# Patient Record
Sex: FEMALE | Race: WHITE | NOT HISPANIC OR LATINO | URBAN - METROPOLITAN AREA
[De-identification: names, ages, dates, MRNs, and addresses within clinical notes are randomized per-mention and may not be internally consistent; named-entity substitution may affect disease eponyms.]

---

## 2017-02-02 ENCOUNTER — INPATIENT (INPATIENT)
Facility: HOSPITAL | Age: 82
LOS: 2 days | Discharge: HOME | End: 2017-02-05
Attending: INTERNAL MEDICINE | Admitting: INTERNAL MEDICINE

## 2017-05-10 ENCOUNTER — OUTPATIENT (OUTPATIENT)
Dept: OUTPATIENT SERVICES | Facility: HOSPITAL | Age: 82
LOS: 1 days | Discharge: HOME | End: 2017-05-10

## 2017-06-28 DIAGNOSIS — I10 ESSENTIAL (PRIMARY) HYPERTENSION: ICD-10-CM

## 2017-06-28 DIAGNOSIS — H25.11 AGE-RELATED NUCLEAR CATARACT, RIGHT EYE: ICD-10-CM

## 2017-06-28 DIAGNOSIS — I48.91 UNSPECIFIED ATRIAL FIBRILLATION: ICD-10-CM

## 2017-06-28 DIAGNOSIS — Z86.73 PERSONAL HISTORY OF TRANSIENT ISCHEMIC ATTACK (TIA), AND CEREBRAL INFARCTION WITHOUT RESIDUAL DEFICITS: ICD-10-CM

## 2017-06-28 DIAGNOSIS — I50.9 HEART FAILURE, UNSPECIFIED: ICD-10-CM

## 2017-07-13 DIAGNOSIS — I10 ESSENTIAL (PRIMARY) HYPERTENSION: ICD-10-CM

## 2017-07-13 DIAGNOSIS — K22.2 ESOPHAGEAL OBSTRUCTION: ICD-10-CM

## 2017-07-13 DIAGNOSIS — K21.9 GASTRO-ESOPHAGEAL REFLUX DISEASE WITHOUT ESOPHAGITIS: ICD-10-CM

## 2017-07-13 DIAGNOSIS — R04.0 EPISTAXIS: ICD-10-CM

## 2017-07-13 DIAGNOSIS — Z79.01 LONG TERM (CURRENT) USE OF ANTICOAGULANTS: ICD-10-CM

## 2017-07-13 DIAGNOSIS — Z86.73 PERSONAL HISTORY OF TRANSIENT ISCHEMIC ATTACK (TIA), AND CEREBRAL INFARCTION WITHOUT RESIDUAL DEFICITS: ICD-10-CM

## 2017-07-13 DIAGNOSIS — I48.91 UNSPECIFIED ATRIAL FIBRILLATION: ICD-10-CM

## 2018-10-17 ENCOUNTER — INPATIENT (INPATIENT)
Facility: HOSPITAL | Age: 83
LOS: 1 days | Discharge: HOME | End: 2018-10-19
Attending: INTERNAL MEDICINE | Admitting: INTERNAL MEDICINE

## 2018-10-17 VITALS
TEMPERATURE: 96 F | SYSTOLIC BLOOD PRESSURE: 152 MMHG | DIASTOLIC BLOOD PRESSURE: 71 MMHG | OXYGEN SATURATION: 100 % | HEART RATE: 81 BPM | RESPIRATION RATE: 18 BRPM

## 2018-10-17 DIAGNOSIS — Z96.7 PRESENCE OF OTHER BONE AND TENDON IMPLANTS: Chronic | ICD-10-CM

## 2018-10-17 DIAGNOSIS — Z90.49 ACQUIRED ABSENCE OF OTHER SPECIFIED PARTS OF DIGESTIVE TRACT: Chronic | ICD-10-CM

## 2018-10-17 LAB
ALBUMIN SERPL ELPH-MCNC: 4.1 G/DL — SIGNIFICANT CHANGE UP (ref 3.5–5.2)
ALP SERPL-CCNC: 98 U/L — SIGNIFICANT CHANGE UP (ref 30–115)
ALT FLD-CCNC: 12 U/L — SIGNIFICANT CHANGE UP (ref 0–41)
ANION GAP SERPL CALC-SCNC: 16 MMOL/L — HIGH (ref 7–14)
APPEARANCE UR: ABNORMAL
AST SERPL-CCNC: 29 U/L — SIGNIFICANT CHANGE UP (ref 0–41)
BACTERIA # UR AUTO: ABNORMAL /HPF
BASE EXCESS BLDV CALC-SCNC: 0.8 MMOL/L — SIGNIFICANT CHANGE UP (ref -2–2)
BASOPHILS # BLD AUTO: 0.11 K/UL — SIGNIFICANT CHANGE UP (ref 0–0.2)
BASOPHILS NFR BLD AUTO: 1.2 % — HIGH (ref 0–1)
BILIRUB DIRECT SERPL-MCNC: <0.2 MG/DL — SIGNIFICANT CHANGE UP (ref 0–0.2)
BILIRUB INDIRECT FLD-MCNC: >0.3 MG/DL — SIGNIFICANT CHANGE UP (ref 0.2–1.2)
BILIRUB SERPL-MCNC: 0.5 MG/DL — SIGNIFICANT CHANGE UP (ref 0.2–1.2)
BILIRUB UR-MCNC: NEGATIVE — SIGNIFICANT CHANGE UP
BUN SERPL-MCNC: 13 MG/DL — SIGNIFICANT CHANGE UP (ref 10–20)
CA-I SERPL-SCNC: 1.14 MMOL/L — SIGNIFICANT CHANGE UP (ref 1.12–1.3)
CALCIUM SERPL-MCNC: 9.1 MG/DL — SIGNIFICANT CHANGE UP (ref 8.5–10.1)
CHLORIDE SERPL-SCNC: 101 MMOL/L — SIGNIFICANT CHANGE UP (ref 98–110)
CO2 SERPL-SCNC: 24 MMOL/L — SIGNIFICANT CHANGE UP (ref 17–32)
COLOR SPEC: YELLOW — SIGNIFICANT CHANGE UP
CREAT SERPL-MCNC: 0.7 MG/DL — SIGNIFICANT CHANGE UP (ref 0.7–1.5)
DIFF PNL FLD: ABNORMAL
EOSINOPHIL # BLD AUTO: 0.35 K/UL — SIGNIFICANT CHANGE UP (ref 0–0.7)
EOSINOPHIL NFR BLD AUTO: 3.9 % — SIGNIFICANT CHANGE UP (ref 0–8)
GAS PNL BLDV: 137 MMOL/L — SIGNIFICANT CHANGE UP (ref 136–145)
GAS PNL BLDV: SIGNIFICANT CHANGE UP
GAS PNL BLDV: SIGNIFICANT CHANGE UP
GLUCOSE SERPL-MCNC: 98 MG/DL — SIGNIFICANT CHANGE UP (ref 70–99)
GLUCOSE UR QL: NEGATIVE MG/DL — SIGNIFICANT CHANGE UP
HCO3 BLDV-SCNC: 26 MMOL/L — SIGNIFICANT CHANGE UP (ref 22–29)
HCT VFR BLD CALC: 36.2 % — LOW (ref 37–47)
HCT VFR BLDA CALC: 31.1 % — LOW (ref 34–44)
HGB BLD CALC-MCNC: 10.1 G/DL — LOW (ref 14–18)
HGB BLD-MCNC: 9.8 G/DL — LOW (ref 12–16)
IMM GRANULOCYTES NFR BLD AUTO: 0.3 % — SIGNIFICANT CHANGE UP (ref 0.1–0.3)
KETONES UR-MCNC: ABNORMAL
LACTATE BLDV-MCNC: 1.1 MMOL/L — SIGNIFICANT CHANGE UP (ref 0.5–1.6)
LEUKOCYTE ESTERASE UR-ACNC: ABNORMAL
LIDOCAIN IGE QN: 37 U/L — SIGNIFICANT CHANGE UP (ref 7–60)
LYMPHOCYTES # BLD AUTO: 1.25 K/UL — SIGNIFICANT CHANGE UP (ref 1.2–3.4)
LYMPHOCYTES # BLD AUTO: 13.8 % — LOW (ref 20.5–51.1)
MAGNESIUM SERPL-MCNC: 2.3 MG/DL — SIGNIFICANT CHANGE UP (ref 1.8–2.4)
MCHC RBC-ENTMCNC: 19.1 PG — LOW (ref 27–31)
MCHC RBC-ENTMCNC: 27.1 G/DL — LOW (ref 32–37)
MCV RBC AUTO: 70.7 FL — LOW (ref 81–99)
MONOCYTES # BLD AUTO: 0.89 K/UL — HIGH (ref 0.1–0.6)
MONOCYTES NFR BLD AUTO: 9.8 % — HIGH (ref 1.7–9.3)
NEUTROPHILS # BLD AUTO: 6.41 K/UL — SIGNIFICANT CHANGE UP (ref 1.4–6.5)
NEUTROPHILS NFR BLD AUTO: 71 % — SIGNIFICANT CHANGE UP (ref 42.2–75.2)
NITRITE UR-MCNC: POSITIVE
NRBC # BLD: 0 /100 WBCS — SIGNIFICANT CHANGE UP (ref 0–0)
PCO2 BLDV: 44 MMHG — SIGNIFICANT CHANGE UP (ref 41–51)
PH BLDV: 7.38 — SIGNIFICANT CHANGE UP (ref 7.26–7.43)
PH UR: 6.5 — SIGNIFICANT CHANGE UP (ref 5–8)
PLATELET # BLD AUTO: 291 K/UL — SIGNIFICANT CHANGE UP (ref 130–400)
PO2 BLDV: 28 MMHG — SIGNIFICANT CHANGE UP (ref 20–40)
POTASSIUM BLDV-SCNC: 3.5 MMOL/L — SIGNIFICANT CHANGE UP (ref 3.3–5.6)
POTASSIUM SERPL-MCNC: 4.5 MMOL/L — SIGNIFICANT CHANGE UP (ref 3.5–5)
POTASSIUM SERPL-SCNC: 4.5 MMOL/L — SIGNIFICANT CHANGE UP (ref 3.5–5)
PROT SERPL-MCNC: 7.3 G/DL — SIGNIFICANT CHANGE UP (ref 6–8)
PROT UR-MCNC: 30 MG/DL
RBC # BLD: 5.12 M/UL — SIGNIFICANT CHANGE UP (ref 4.2–5.4)
RBC # FLD: 19.5 % — HIGH (ref 11.5–14.5)
SAO2 % BLDV: 43 % — SIGNIFICANT CHANGE UP
SODIUM SERPL-SCNC: 141 MMOL/L — SIGNIFICANT CHANGE UP (ref 135–146)
SP GR SPEC: 1.02 — SIGNIFICANT CHANGE UP (ref 1.01–1.03)
TROPONIN T SERPL-MCNC: <0.01 NG/ML — SIGNIFICANT CHANGE UP
UROBILINOGEN FLD QL: 1 MG/DL (ref 0.2–0.2)
WBC # BLD: 9.04 K/UL — SIGNIFICANT CHANGE UP (ref 4.8–10.8)
WBC # FLD AUTO: 9.04 K/UL — SIGNIFICANT CHANGE UP (ref 4.8–10.8)
WBC UR QL: >50 /HPF

## 2018-10-17 RX ORDER — CEFTRIAXONE 500 MG/1
1 INJECTION, POWDER, FOR SOLUTION INTRAMUSCULAR; INTRAVENOUS ONCE
Qty: 0 | Refills: 0 | Status: COMPLETED | OUTPATIENT
Start: 2018-10-17 | End: 2018-10-17

## 2018-10-17 RX ORDER — SENNA PLUS 8.6 MG/1
2 TABLET ORAL AT BEDTIME
Qty: 0 | Refills: 0 | Status: DISCONTINUED | OUTPATIENT
Start: 2018-10-17 | End: 2018-10-19

## 2018-10-17 RX ORDER — MAGNESIUM SULFATE 500 MG/ML
2 VIAL (ML) INJECTION ONCE
Qty: 0 | Refills: 0 | Status: COMPLETED | OUTPATIENT
Start: 2018-10-17 | End: 2018-10-17

## 2018-10-17 RX ORDER — PANTOPRAZOLE SODIUM 20 MG/1
40 TABLET, DELAYED RELEASE ORAL
Qty: 0 | Refills: 0 | Status: DISCONTINUED | OUTPATIENT
Start: 2018-10-17 | End: 2018-10-19

## 2018-10-17 RX ORDER — ENOXAPARIN SODIUM 100 MG/ML
40 INJECTION SUBCUTANEOUS DAILY
Qty: 0 | Refills: 0 | Status: DISCONTINUED | OUTPATIENT
Start: 2018-10-17 | End: 2018-10-19

## 2018-10-17 RX ORDER — ASPIRIN/CALCIUM CARB/MAGNESIUM 324 MG
81 TABLET ORAL DAILY
Qty: 0 | Refills: 0 | Status: DISCONTINUED | OUTPATIENT
Start: 2018-10-17 | End: 2018-10-19

## 2018-10-17 RX ORDER — DOCUSATE SODIUM 100 MG
100 CAPSULE ORAL THREE TIMES A DAY
Qty: 0 | Refills: 0 | Status: DISCONTINUED | OUTPATIENT
Start: 2018-10-17 | End: 2018-10-19

## 2018-10-17 RX ORDER — CEFTRIAXONE 500 MG/1
1 INJECTION, POWDER, FOR SOLUTION INTRAMUSCULAR; INTRAVENOUS EVERY 24 HOURS
Qty: 0 | Refills: 0 | Status: DISCONTINUED | OUTPATIENT
Start: 2018-10-17 | End: 2018-10-19

## 2018-10-17 RX ORDER — METOPROLOL TARTRATE 50 MG
25 TABLET ORAL
Qty: 0 | Refills: 0 | Status: DISCONTINUED | OUTPATIENT
Start: 2018-10-17 | End: 2018-10-19

## 2018-10-17 RX ORDER — RIVAROXABAN 15 MG-20MG
0 KIT ORAL
Qty: 0 | Refills: 0 | COMMUNITY

## 2018-10-17 RX ORDER — SODIUM CHLORIDE 9 MG/ML
125 INJECTION INTRAMUSCULAR; INTRAVENOUS; SUBCUTANEOUS ONCE
Qty: 0 | Refills: 0 | Status: COMPLETED | OUTPATIENT
Start: 2018-10-17 | End: 2018-10-17

## 2018-10-17 RX ADMIN — Medication 100 GRAM(S): at 17:07

## 2018-10-17 RX ADMIN — SODIUM CHLORIDE 125 MILLILITER(S): 9 INJECTION INTRAMUSCULAR; INTRAVENOUS; SUBCUTANEOUS at 17:07

## 2018-10-17 RX ADMIN — CEFTRIAXONE 100 GRAM(S): 500 INJECTION, POWDER, FOR SOLUTION INTRAMUSCULAR; INTRAVENOUS at 20:55

## 2018-10-17 NOTE — ED PROVIDER NOTE - PHYSICAL EXAMINATION
Physical Exam    Vital Signs: I have reviewed the initial vital signs.  Constitutional: well-nourished, appears stated age, appears uncomfortably moving around in stretcher  HEENT: Conjunctiva pink, Sclera clear, PERRLA, EOMI. Mucous membranes moist, no exudates or lesions noted. Patient hard of hearing  Cardiovascular: S1 and S2 present, regular rate, regular rhythm, well-perfused extremities, radial pulses equal and 2+ No peripheral edema  Respiratory: unlabored respiratory effort, clear to auscultation bilaterally no wheezing, rales and rhonchi  Gastrointestinal: soft, non-tender abdomen, no pulsatile mass, active bowel sounds in all 4 quadrants. No reproducible pain with palpation. No distention or rigidity  Musculoskeletal: supple nontender neck, no midline tenderness, no joint pain  Integumentary: warm, dry, no rash  Neurologic: awake, alert to person. CN 2-12 intact, motor functions grossly intact  Psychiatric: appropriate mood, appropriate affect

## 2018-10-17 NOTE — H&P ADULT - NSHPLABSRESULTS_GEN_ALL_CORE
Allergies    No Known Allergies    Intolerances        T(F): 96 (10-17-18 @ 21:30), Max: 98.4 (10-17-18 @ 17:10)  HR: 76 (10-17-18 @ 21:30) (76 - 81)  BP: 147/79 (10-17-18 @ 21:30) (147/79 - 158/77)  BP(mean): --  RR: 18 (10-17-18 @ 21:30) (18 - 18)  SpO2: 98% (10-17-18 @ 20:52) (98% - 100%)    10-17    141  |  101  |  13  ----------------------------<  98  4.5   |  24  |  0.7    Ca    9.1      17 Oct 2018 16:21  Mg     2.3     10-17    TPro  7.3  /  Alb  4.1  /  TBili  0.5  /  DBili  <0.2  /  AST  29  /  ALT  12  /  AlkPhos  98  10-17                            9.8    9.04  )-----------( 291      ( 17 Oct 2018 15:52 )             36.2                   Urinalysis Basic - ( 17 Oct 2018 19:50 )    Color: Yellow / Appearance: Turbid / S.020 / pH: x  Gluc: x / Ketone: Trace  / Bili: Negative / Urobili: 1.0 mg/dL   Blood: x / Protein: 30 mg/dL / Nitrite: Positive   Leuk Esterase: Large / RBC: x / WBC >50 /HPF   Sq Epi: x / Non Sq Epi: x / Bacteria: Moderate /HPF          Urinalysis Basic - ( 17 Oct 2018 19:50 )    Color: Yellow / Appearance: Turbid / S.020 / pH: x  Gluc: x / Ketone: Trace  / Bili: Negative / Urobili: 1.0 mg/dL   Blood: x / Protein: 30 mg/dL / Nitrite: Positive   Leuk Esterase: Large / RBC: x / WBC >50 /HPF   Sq Epi: x / Non Sq Epi: x / Bacteria: Moderate /HPF      CARDIAC MARKERS ( 17 Oct 2018 15:52 )  x     / <0.01 ng/mL / x     / x     / x    < from: CT Head No Cont (10.17.18 @ 19:19) >    1.  No CT evidence for acute intracranial pathology.    2.  Sclerosis of the bilateral mastoids, unchanged.    3.  Bilateral punctate foci of air in the bilateral ocular globes,   unchanged.      < end of copied text >    < from: CT Abdomen and Pelvis w/ IV Cont (10.17.18 @ 19:19) >    Diffusely thickened urinary bladder wall with urothelial enhancement.   Clinical correlation recommended with urinalysis.    Suggestion of superficial ulceration overlying the sacral soft tissues.      Nonacute incidental findings are above.    < end of copied text >

## 2018-10-17 NOTE — ED PROVIDER NOTE - NS ED ROS FT
Constitutional: (-) fever (-) weakness  Head: (-) trauma  EENT: (-) blurry vision, (-) epistaxis (-) sore throat  Cardiovascular: (+) chest pain, (-) syncope  Respiratory: (-) cough, (-) shortness of breath  Gastrointestinal: (-) vomiting, (-) diarrhea (-) nausea (+) abdominal pain  Genitourinary: (-) dysuria (-) frequency (-) hematuria  Musculoskeletal: (-) neck pain, (+) back pain, (-) joint pain  Integumentary: (-) rash, (-) edema  Neurological: (-) headache, (-) altered mental status  Allergic/Immunologic: (-) pruritus

## 2018-10-17 NOTE — H&P ADULT - HEM GEN HX ROS MEA POS PC
has hisoptry of brusing ealisy which is the reason she stopped taking her carelto and other medications

## 2018-10-17 NOTE — H&P ADULT - HISTORY OF PRESENT ILLNESS
88 yo pleasant female with PMH as below from home brought in by her daughter who is a nurse at [employer] for increasing confusion over the past 2 weeks. Patients lives at home alone and completes all her ADLs. history per daughter is that over the past 2-3 weeks she has been having increasing confusion, for example 2 weeks ago her daughter came to the house to apply  a lotion to her back  and after leaving the next day patient denies that the daughter was ever there and that she did not put on any lotion for her. last week thursday she went on a trip with her daughter and [facility] to [place], but the next day did not recall the event. Daughter brought her in for an evaluation, patient was found to have a UTI. She does endorse having increased urinary frequency, and burning with urination. States that she has been having suprapubic pain and RLQ pain for the past 1 day, but daughter believes it is likely longer which I agree with. denies any fevers, chills, chest pain, nausea, vomitting, endorses chronic constipation.  Family felt uncomfortable taking patient home given that she lives alone, which is reasonable.        interval history: patient stopped taking all her meds a few months ago (she was previously on metoprolol 25mg q12h, xarelto,  and pepcid) stating that she does not want to take them anymore.  (that is the reason my med rec reflects that she is on no medications, called [facility] pharmacy which also states she has no medications filled)

## 2018-10-17 NOTE — ED PROVIDER NOTE - CARE PLAN
Assessment and plan of treatment:	Plan: EKG, CXR, labs, ct head, ct abd and pelvis with iv contrast, urine, gentle fluid hydration, reassess. Principal Discharge DX:	Urinary tract infection  Assessment and plan of treatment:	Plan: EKG, CXR, labs, ct head, ct abd and pelvis with iv contrast, urine, gentle fluid hydration, reassess.  Secondary Diagnosis:	Confusion

## 2018-10-17 NOTE — ED PROVIDER NOTE - OBJECTIVE STATEMENT
89 year old female with history of a fib, stroke, HTN, hyperlipidemia, HF presenting with abdominal pain, chest pain, and confusion. patient has had this pain before but never as bad as the past few days. She states she could not sleep last night. The pain is in the right side of her abdomen and radiates to her back. She denies nausea, vomiting, fever, chills, headache, melena, dizziness. She presents from her PMD after having urine dipstick consistent with UTI. She also has been noncompliant in her medications which includes Xarelto. Her daughter also states she has been having intermittent confusion over the past several weeks. Patient is unable to describe her pain but severity is 9/10, radiates to back, nothing improves or worsens it. 89 year old female with history of a fib, stroke, HTN, hyperlipidemia, HF presenting with abdominal pain, and confusion. patient has had this pain before but never as bad as the past few days. She states she could not sleep last night. The pain is aching, in the right side of her abdomen and radiates to her back. She denies nausea, vomiting, fever, chills, headache, melena, dizziness. She presents from her PMD after having urine dipstick consistent with UTI. She also has been noncompliant in her medications which includes Xarelto. Her daughter also states she has been having intermittent confusion over the past 2 weeks. Patient is unable to describe her pain but severity is 9/10, radiates to back, nothing improves or worsens it.

## 2018-10-17 NOTE — ED PROVIDER NOTE - ATTENDING CONTRIBUTION TO CARE
A 89 y/o f  lives at home w/ pmhx of afib was on xarelto, has not taken it, cva no residual weakness, uterine prolapse, htn, dld, chf present w/ epigastric abd pain ~ 1 month, worse over the past few days, today being the worse as pt started to feel lower chest pain associated with increased confusion per son at bedside and weakness. daughter is a nurse and did a urine dip which was positive ofr leukocytes so pt went to see pmd and was sent to ed for further evaluation. denies fever, chills, n/v, sob, pleuritic cp, palpitations, diaphoresis, cough, tinnitus, neck pain/stiffness, back pain, photophobia/phonophobia, blurry vision/visual changes,  diarrhea, constipation, melena/brbpr, numbness/tingling, HA, syncope, sick contacts, recent travel or rash.  on exam: elderly female sitting on stretcher in nad, no rash, no signs of trauma, PERRL, EOM intact, no nystagmus, dry mm, neck supple, no spinous ttp to neck or back, FROM, no palpable shelves or step offs, no meningeal signs, irrefgular, irregular, radial pulses 2/4 b/l, breath sounds present b/l, no wheezing or crackles, poor air exchange, poor respiratory effort, no accessory muscle use, no tachypnea, no stridor, bs present throughout all 4 quadrants, abd soft, nd, tenderness to palpation to LLQ and suprapubic region, no rebound tenderness or guarding, no cvat, FROM of upper and lower ext, no drift, no calf pain/swelling/erythema, AAOx2- currently baseline per son. Motor 5/5 and sensation intact throughout upper and lower ext. CN II-XII intact. No facial droop or slurring of speech. (-) Pronator, no dysmetria w/ ftn or rapid alternating fine movements,  ambulating with no ataxia or difficulty. NIH O.

## 2018-10-17 NOTE — ED PROVIDER NOTE - PROGRESS NOTE DETAILS
previous hgb  10.3 in february today 9.8. lactate 1.1 , pt going to ct now. Discussed CT and labs with patient's daughter. Patient lives alone and is more confused than usual 2/2 UTI, thus will admit as patient is an unsafe discharge. I was directly involved in the management of this patient. Case was discussed with PA Jocelyne Garduno

## 2018-10-17 NOTE — H&P ADULT - PMH
Atrial fibrillation    Diverticulosis    Esophageal stricture    GERD (gastroesophageal reflux disease)    Hip fracture    Hyperlipidemia    Hypertension    Stroke  no residual defecits

## 2018-10-17 NOTE — H&P ADULT - NSHPPHYSICALEXAM_GEN_ALL_CORE
PHYSICAL EXAM:  GENERAL: NAD, speaks in full sentences, no signs of respiratory distress  HEAD:  Atraumatic, Normocephalic  EYES: conjunctiva and sclera clear  NECK: Supple, No JVD  CHEST/LUNG: Clear to auscultation bilaterally; No wheeze; No crackles; No accessory muscles used  HEART: irregular rythym, normal rate; No murmurs;   ABDOMEN: Soft, + suprapubic  and rlq tenderness to palpation, Nondistended; No guarding  EXTREMITIES:   No cyanosis or edema  PSYCH: AAOx3, times of confusion during conversation  NEUROLOGY: non-focal

## 2018-10-17 NOTE — ED PROVIDER NOTE - PLAN OF CARE
Plan: EKG, CXR, labs, ct head, ct abd and pelvis with iv contrast, urine, gentle fluid hydration, reassess.

## 2018-10-17 NOTE — ED PROVIDER NOTE - MEDICAL DECISION MAKING DETAILS
pt and family aware of all labs and imaging, agree with plan for admission, antibiotics given, results reviewed, medical admitting team aware of pt.

## 2018-10-17 NOTE — H&P ADULT - NSHPSOCIALHISTORY_GEN_ALL_CORE
>40 py smoking history (quit in her 50's)  social etoh use  never used drugs    ambulates with a walker at times and sometimes independently

## 2018-10-17 NOTE — H&P ADULT - ATTENDING COMMENTS
pt seen and examined independently, I have read and agree with above exam and poa,  awake, comf, confused  no distress is comfortable    metabolic encephalopathy/uti  afib/htn    continue iv rocephin  continuecheck uc/s/bldc/s    continue current meds

## 2018-10-17 NOTE — H&P ADULT - ASSESSMENT
#confusion 2/2 Cystitis/UTI  - admit to medicine  - rocephin 1g q24h (no RF for ESBL/MDR bateruria)  - follow up urine culture,       #A fib/htn  chads2 vasc= 3 (age, htn, gender)  -metoprolol 25mg q12h  - would not give xarelto given that patient was not taking it as outpatient (2/2 bruising) and she is 89 years old (risk vs benefit)    #Constipation  -senna+colace    #DVT ppx- lovenox sq, ambulate as tolerated with assistance and a walker    #DASH diet #confusion 2/2 Cystitis/UTI  - admit to medicine  - rocephin 1g q24h (no RF for ESBL/MDR bateruria)  - follow up urine culture,       #A fib/htn  chads2 vasc= 3 (age, htn, gender)  -metoprolol 25mg q12h  - would not give xarelto given that patient was not taking it as outpatient (2/2 bruising) and she is 89 years old (risk vs benefit)    #Constipation  -senna+colace    #microcytic anemia  -check iron studies  -monitor H&H    #DVT ppx- lovenox sq, ambulate as tolerated with assistance and a walker    #DASH diet

## 2018-10-17 NOTE — ED ADULT NURSE NOTE - OBJECTIVE STATEMENT
pt presents to ED c/o right sided abdominal pain radiating to back, associated with increased confusion and frequent urination. Pt becoming more confused, was seen in PMD office which urine dip was done and showed +UTI. Denies any fevers or chills, n/v/d

## 2018-10-18 LAB
ANION GAP SERPL CALC-SCNC: 13 MMOL/L — SIGNIFICANT CHANGE UP (ref 7–14)
BASOPHILS # BLD AUTO: 0.08 K/UL — SIGNIFICANT CHANGE UP (ref 0–0.2)
BASOPHILS NFR BLD AUTO: 0.7 % — SIGNIFICANT CHANGE UP (ref 0–1)
BUN SERPL-MCNC: 11 MG/DL — SIGNIFICANT CHANGE UP (ref 10–20)
CALCIUM SERPL-MCNC: 8.6 MG/DL — SIGNIFICANT CHANGE UP (ref 8.5–10.1)
CHLORIDE SERPL-SCNC: 104 MMOL/L — SIGNIFICANT CHANGE UP (ref 98–110)
CO2 SERPL-SCNC: 24 MMOL/L — SIGNIFICANT CHANGE UP (ref 17–32)
CREAT SERPL-MCNC: 0.7 MG/DL — SIGNIFICANT CHANGE UP (ref 0.7–1.5)
EOSINOPHIL # BLD AUTO: 0.27 K/UL — SIGNIFICANT CHANGE UP (ref 0–0.7)
EOSINOPHIL NFR BLD AUTO: 2.5 % — SIGNIFICANT CHANGE UP (ref 0–8)
GLUCOSE SERPL-MCNC: 127 MG/DL — HIGH (ref 70–99)
HCT VFR BLD CALC: 34.5 % — LOW (ref 37–47)
HGB BLD-MCNC: 9.4 G/DL — LOW (ref 12–16)
IMM GRANULOCYTES NFR BLD AUTO: 0.9 % — HIGH (ref 0.1–0.3)
IRON SATN MFR SERPL: 23 UG/DL — LOW (ref 35–150)
IRON SATN MFR SERPL: 7 % — LOW (ref 15–50)
LYMPHOCYTES # BLD AUTO: 1.05 K/UL — LOW (ref 1.2–3.4)
LYMPHOCYTES # BLD AUTO: 9.8 % — LOW (ref 20.5–51.1)
MAGNESIUM SERPL-MCNC: 2.4 MG/DL — SIGNIFICANT CHANGE UP (ref 1.8–2.4)
MCHC RBC-ENTMCNC: 19.2 PG — LOW (ref 27–31)
MCHC RBC-ENTMCNC: 27.2 G/DL — LOW (ref 32–37)
MCV RBC AUTO: 70.6 FL — LOW (ref 81–99)
MONOCYTES # BLD AUTO: 0.98 K/UL — HIGH (ref 0.1–0.6)
MONOCYTES NFR BLD AUTO: 9.2 % — SIGNIFICANT CHANGE UP (ref 1.7–9.3)
NEUTROPHILS # BLD AUTO: 8.2 K/UL — HIGH (ref 1.4–6.5)
NEUTROPHILS NFR BLD AUTO: 76.9 % — HIGH (ref 42.2–75.2)
PLATELET # BLD AUTO: 276 K/UL — SIGNIFICANT CHANGE UP (ref 130–400)
POTASSIUM SERPL-MCNC: 4.5 MMOL/L — SIGNIFICANT CHANGE UP (ref 3.5–5)
POTASSIUM SERPL-SCNC: 4.5 MMOL/L — SIGNIFICANT CHANGE UP (ref 3.5–5)
RBC # BLD: 4.89 M/UL — SIGNIFICANT CHANGE UP (ref 4.2–5.4)
RBC # FLD: 19.1 % — HIGH (ref 11.5–14.5)
SODIUM SERPL-SCNC: 141 MMOL/L — SIGNIFICANT CHANGE UP (ref 135–146)
TIBC SERPL-MCNC: 329 UG/DL — SIGNIFICANT CHANGE UP (ref 220–430)
TRANSFERRIN SERPL-MCNC: 275 MG/DL — SIGNIFICANT CHANGE UP (ref 200–360)
UIBC SERPL-MCNC: 306 UG/DL — SIGNIFICANT CHANGE UP (ref 110–370)
WBC # BLD: 10.68 K/UL — SIGNIFICANT CHANGE UP (ref 4.8–10.8)
WBC # FLD AUTO: 10.68 K/UL — SIGNIFICANT CHANGE UP (ref 4.8–10.8)

## 2018-10-18 RX ORDER — INFLUENZA VIRUS VACCINE 15; 15; 15; 15 UG/.5ML; UG/.5ML; UG/.5ML; UG/.5ML
0.5 SUSPENSION INTRAMUSCULAR ONCE
Qty: 0 | Refills: 0 | Status: COMPLETED | OUTPATIENT
Start: 2018-10-18 | End: 2018-10-19

## 2018-10-18 RX ORDER — FERROUS SULFATE 325(65) MG
325 TABLET ORAL DAILY
Qty: 0 | Refills: 0 | Status: DISCONTINUED | OUTPATIENT
Start: 2018-10-18 | End: 2018-10-19

## 2018-10-18 RX ADMIN — PANTOPRAZOLE SODIUM 40 MILLIGRAM(S): 20 TABLET, DELAYED RELEASE ORAL at 06:25

## 2018-10-18 RX ADMIN — Medication 100 MILLIGRAM(S): at 21:42

## 2018-10-18 RX ADMIN — ENOXAPARIN SODIUM 40 MILLIGRAM(S): 100 INJECTION SUBCUTANEOUS at 11:01

## 2018-10-18 RX ADMIN — Medication 25 MILLIGRAM(S): at 17:27

## 2018-10-18 RX ADMIN — CEFTRIAXONE 100 GRAM(S): 500 INJECTION, POWDER, FOR SOLUTION INTRAMUSCULAR; INTRAVENOUS at 17:27

## 2018-10-18 RX ADMIN — Medication 25 MILLIGRAM(S): at 06:25

## 2018-10-18 RX ADMIN — Medication 81 MILLIGRAM(S): at 11:01

## 2018-10-18 RX ADMIN — Medication 100 MILLIGRAM(S): at 06:25

## 2018-10-18 RX ADMIN — SENNA PLUS 2 TABLET(S): 8.6 TABLET ORAL at 21:42

## 2018-10-18 NOTE — PROGRESS NOTE ADULT - ASSESSMENT
#confusion 2/2 Cystitis/UTI  - back to baseline mentation as per daughter. AAOx 3 on exam.   - c/w rocephin 1g q24h   - urine culture- pending.     #A fib/htn  chads2 vasc= 3 (age, htn, gender)  -metoprolol 25mg q12h  - As per hospitalist, xarelto to be resumed, given CHAdvasc  of 3 and pt is active at baseline ( does her ADLs). benefit outweighs risk.     #Constipation  -senna+colace    #microcytic anemia  -microcytic, low serum iron.   -monitor H&H  - start on ferrous sulphate.     #DVT ppx- lovenox sq, ambulate as tolerated with assistance and a walker    #DASH diet  #dispo- home. #confusion 2/2 Cystitis/UTI  - back to baseline mentation as per daughter. AAOx 3 on exam.   - c/w rocephin 1g q24h   - urine culture- pending.     #A fib/htn  chads2 vasc= 3 (age, htn, gender)  -metoprolol 25mg q12h  - As per hospitalist, xarelto to be resumed, given CHAdvasc  of 3 and pt is active at baseline ( does her ADLs). benefit outweighs risk.  - c/w aspirin. pt  not keen on resuming xarelto as per pt daughter. understand the risks and benefits.     #Constipation  -senna+colace    #microcytic anemia  -microcytic, low serum iron.   -monitor H&H  - start on ferrous sulphate.     #DVT ppx- lovenox sq, ambulate as tolerated with assistance and a walker    #DASH diet  #dispo- home.

## 2018-10-18 NOTE — PROGRESS NOTE ADULT - SUBJECTIVE AND OBJECTIVE BOX
SUBJECTIVE:    Patient is a 89y old Female who presents with a chief complaint of UTI (17 Oct 2018 22:59)    Currently admitted to medicine with the primary diagnosis of Urinary tract infection     Today is hospital day 1d. This morning she is resting comfortably in bed and reports no new issues or overnight events. Pt is accompanied by her daughter at bedside. As per daughter, pt is back at her baseline mentation. Pt denies any abdominal pain, has been making urine.     PAST MEDICAL & SURGICAL HISTORY  Esophageal stricture  Hip fracture  GERD (gastroesophageal reflux disease)  Diverticulosis  Hyperlipidemia  Hypertension  Stroke: no residual defecits  Atrial fibrillation  S/P ORIF (open reduction internal fixation) fracture: left hip  S/P cholecystectomy    SOCIAL HISTORY:  Negative for smoking/alcohol/drug use.     ALLERGIES:  No Known Allergies    MEDICATIONS:  STANDING MEDICATIONS  aspirin  chewable 81 milliGRAM(s) Oral daily  cefTRIAXone   IVPB 1 Gram(s) IV Intermittent every 24 hours  docusate sodium 100 milliGRAM(s) Oral three times a day  enoxaparin Injectable 40 milliGRAM(s) SubCutaneous daily  influenza   Vaccine 0.5 milliLiter(s) IntraMuscular once  metoprolol tartrate 25 milliGRAM(s) Oral two times a day  pantoprazole    Tablet 40 milliGRAM(s) Oral before breakfast  senna 2 Tablet(s) Oral at bedtime    PRN MEDICATIONS    VITALS:   T(F): 96.4  HR: 77  BP: 109/59  RR: 20  SpO2: 98%    LABS:                        9.4    10.68 )-----------( 276      ( 18 Oct 2018 10:52 )             34.5     10-    141  |  104  |  11  ----------------------------<  127<H>  4.5   |  24  |  0.7    Ca    8.6      18 Oct 2018 10:52  Mg     2.4     10-18    TPro  7.3  /  Alb  4.1  /  TBili  0.5  /  DBili  <0.2  /  AST  29  /  ALT  12  /  AlkPhos  98  10-17      Urinalysis Basic - ( 17 Oct 2018 19:50 )    Color: Yellow / Appearance: Turbid / S.020 / pH: x  Gluc: x / Ketone: Trace  / Bili: Negative / Urobili: 1.0 mg/dL   Blood: x / Protein: 30 mg/dL / Nitrite: Positive   Leuk Esterase: Large / RBC: x / WBC >50 /HPF   Sq Epi: x / Non Sq Epi: x / Bacteria: Moderate /HPF        Troponin T, Serum: <0.01 ng/mL (10-17-18 @ 15:52)      CARDIAC MARKERS ( 17 Oct 2018 15:52 )  x     / <0.01 ng/mL / x     / x     / x          Iron with Total Binding Capacity in AM (10.18.18 @ 10:52)    Iron - Total Binding Capacity.: 329 ug/dL    % Saturation, Iron: 7 %    Iron Total, Serum: 23 ug/dL    Unsaturated Iron Binding Capacity: 306 ug/dL        RADIOLOGY:  < from: CT Abdomen and Pelvis w/ IV Cont (10.17.18 @ 19:19) >  IMPRESSION:     Diffusely thickened urinary bladder wall with urothelial enhancement.   Clinical correlation recommended with urinalysis.    Suggestion of superficial ulceration overlying the sacral soft tissues.      < end of copied text >    PHYSICAL EXAM:  GEN: No acute distress  LUNGS: Clear to auscultation bilaterally   HEART: S1/S2 present. RRR.   ABD: Soft, non-tender, non-distended. Bowel sounds present  EXT: NC/NC/NE/2+PP/MICHAEL  NEURO: AAOX3

## 2018-10-19 VITALS
RESPIRATION RATE: 18 BRPM | DIASTOLIC BLOOD PRESSURE: 60 MMHG | HEART RATE: 82 BPM | SYSTOLIC BLOOD PRESSURE: 120 MMHG | TEMPERATURE: 96 F

## 2018-10-19 LAB
-  AMIKACIN: SIGNIFICANT CHANGE UP
-  AMOXICILLIN/CLAVULANIC ACID: SIGNIFICANT CHANGE UP
-  AMPICILLIN/SULBACTAM: SIGNIFICANT CHANGE UP
-  AMPICILLIN: SIGNIFICANT CHANGE UP
-  AZTREONAM: SIGNIFICANT CHANGE UP
-  CEFAZOLIN: SIGNIFICANT CHANGE UP
-  CEFEPIME: SIGNIFICANT CHANGE UP
-  CEFOXITIN: SIGNIFICANT CHANGE UP
-  CEFTRIAXONE: SIGNIFICANT CHANGE UP
-  CIPROFLOXACIN: SIGNIFICANT CHANGE UP
-  ERTAPENEM: SIGNIFICANT CHANGE UP
-  GENTAMICIN: SIGNIFICANT CHANGE UP
-  IMIPENEM: SIGNIFICANT CHANGE UP
-  LEVOFLOXACIN: SIGNIFICANT CHANGE UP
-  MEROPENEM: SIGNIFICANT CHANGE UP
-  NITROFURANTOIN: SIGNIFICANT CHANGE UP
-  PIPERACILLIN/TAZOBACTAM: SIGNIFICANT CHANGE UP
-  TIGECYCLINE: SIGNIFICANT CHANGE UP
-  TOBRAMYCIN: SIGNIFICANT CHANGE UP
-  TRIMETHOPRIM/SULFAMETHOXAZOLE: SIGNIFICANT CHANGE UP
CULTURE RESULTS: SIGNIFICANT CHANGE UP
FERRITIN SERPL-MCNC: 20 NG/ML — SIGNIFICANT CHANGE UP (ref 15–150)
METHOD TYPE: SIGNIFICANT CHANGE UP
ORGANISM # SPEC MICROSCOPIC CNT: SIGNIFICANT CHANGE UP
ORGANISM # SPEC MICROSCOPIC CNT: SIGNIFICANT CHANGE UP
SPECIMEN SOURCE: SIGNIFICANT CHANGE UP

## 2018-10-19 RX ORDER — ASPIRIN/CALCIUM CARB/MAGNESIUM 324 MG
1 TABLET ORAL
Qty: 0 | Refills: 0 | COMMUNITY
Start: 2018-10-19

## 2018-10-19 RX ORDER — MOXIFLOXACIN HYDROCHLORIDE TABLETS, 400 MG 400 MG/1
1 TABLET, FILM COATED ORAL
Qty: 10 | Refills: 0 | OUTPATIENT
Start: 2018-10-19 | End: 2018-10-23

## 2018-10-19 RX ORDER — METOPROLOL TARTRATE 50 MG
1 TABLET ORAL
Qty: 0 | Refills: 0 | COMMUNITY
Start: 2018-10-19

## 2018-10-19 RX ADMIN — Medication 100 MILLIGRAM(S): at 06:47

## 2018-10-19 RX ADMIN — INFLUENZA VIRUS VACCINE 0.5 MILLILITER(S): 15; 15; 15; 15 SUSPENSION INTRAMUSCULAR at 15:47

## 2018-10-19 RX ADMIN — Medication 325 MILLIGRAM(S): at 12:02

## 2018-10-19 RX ADMIN — Medication 81 MILLIGRAM(S): at 12:02

## 2018-10-19 RX ADMIN — ENOXAPARIN SODIUM 40 MILLIGRAM(S): 100 INJECTION SUBCUTANEOUS at 12:02

## 2018-10-19 RX ADMIN — PANTOPRAZOLE SODIUM 40 MILLIGRAM(S): 20 TABLET, DELAYED RELEASE ORAL at 06:47

## 2018-10-19 RX ADMIN — Medication 25 MILLIGRAM(S): at 06:47

## 2018-10-19 NOTE — DISCHARGE NOTE ADULT - CARE PLAN
Principal Discharge DX:	Urinary tract infection  Goal:	resolution  Assessment and plan of treatment:	you came in with c/o confusion. UA was positive for UTI.   you were started on antibiotics with clinical improvement.   Please complete the course of antibiotic- Ciprofloxacin 500mg twice a day for 5 days. PLease follo liliana with your PMD for further follow up.  Secondary Diagnosis:	Confusion  Goal:	resolution, back to baseline  Assessment and plan of treatment:	you presented with confusion likely due to underlying UTI.  you responded clinically with antibiotics.   please complete your antibiotic course.

## 2018-10-19 NOTE — PROGRESS NOTE ADULT - SUBJECTIVE AND OBJECTIVE BOX
Patient was seen and examined. Spoke with RN. Chart reviewed.  No events overnight.  Vital Signs Last 24 Hrs  T(F): 96 (19 Oct 2018 04:55), Max: 96.4 (18 Oct 2018 12:46)  HR: 78 (19 Oct 2018 04:55) (76 - 83)  BP: 119/57 (19 Oct 2018 04:55) (109/55 - 134/74)  SpO2: --  MEDICATIONS  (STANDING):  aspirin  chewable 81 milliGRAM(s) Oral daily  cefTRIAXone   IVPB 1 Gram(s) IV Intermittent every 24 hours  docusate sodium 100 milliGRAM(s) Oral three times a day  enoxaparin Injectable 40 milliGRAM(s) SubCutaneous daily  ferrous    sulfate 325 milliGRAM(s) Oral daily  influenza   Vaccine 0.5 milliLiter(s) IntraMuscular once  metoprolol tartrate 25 milliGRAM(s) Oral two times a day  pantoprazole    Tablet 40 milliGRAM(s) Oral before breakfast  senna 2 Tablet(s) Oral at bedtime    MEDICATIONS  (PRN):    Labs:                        9.4    10.68 )-----------( 276      ( 18 Oct 2018 10:52 )             34.5                         9.8    9.04  )-----------( 291      ( 17 Oct 2018 15:52 )             36.2     18 Oct 2018 10:52    141    |  104    |  11     ----------------------------<  127    4.5     |  24     |  0.7    17 Oct 2018 16:21    141    |  101    |  13     ----------------------------<  98     4.5     |  24     |  0.7      Ca    8.6        18 Oct 2018 10:52  Ca    9.1        17 Oct 2018 16:21  Mg     2.4       18 Oct 2018 10:52  Mg     2.3       17 Oct 2018 16:21    TPro  7.3    /  Alb  4.1    /  TBili  0.5    /  DBili  <0.2   /  AST  29     /  ALT  12     /  AlkPhos  98     17 Oct 2018 16:21      Urinalysis Basic - ( 17 Oct 2018 19:50 )    Color: Yellow / Appearance: Turbid / S.020 / pH: x  Gluc: x / Ketone: Trace  / Bili: Negative / Urobili: 1.0 mg/dL   Blood: x / Protein: 30 mg/dL / Nitrite: Positive   Leuk Esterase: Large / RBC: x / WBC >50 /HPF   Sq Epi: x / Non Sq Epi: x / Bacteria: Moderate /HPF        Culture - Urine (collected 17 Oct 2018 19:50)  Source: .Urine Clean Catch (Midstream)  Preliminary Report (18 Oct 2018 20:35):    >100,000 CFU/ml Escherichia coli      General: comfortable, NAD  Neurology: A&Ox3, nonfocal  Head:  Normocephalic, atraumatic  ENT:  Mucosa moist, no ulcerations  Neck:  Supple, no JVD,   Skin: no breakdowns (as per RN)  Resp: CTA B/L  CV: RRR, S1S2,   GI: Soft, NT, bowel sounds  MS: No edema, + peripheral pulses, FROM all 4 extremity      A/P:   88 yo woman  with resolved confusion 2/2 Cystitis/UTI  - back to baseline mentation as per daughter. AAOx 3 on exam.   - c/w rocephin 1g q24h     #A fib/htn  chads2 vasc= 3 (age, htn, gender)  -metoprolol 25mg q12h  - c/w aspirin; no ACT as per pt daughter who understand the risks and benefits.     DC today on PO abx    cipro 500 BID for 5 days  DVT prophylaxis  Decubitus prevention- all measures as per RN protocol  Please call or text me with any questions or updates

## 2018-10-19 NOTE — DISCHARGE NOTE ADULT - HOSPITAL COURSE
89 yo pleasant female with PMH as below from home brought in by her daughter who is a nurse at [employer] for increasing confusion over the past 2 weeks. UA was positive for UTI and Ct scan showed thickened bladder wall consistent with UTI.   Pt treated with antibiotics and responded clinically with mentation back to baseline and decreased dysuria.   Pt being discharged on oral abx ciprofloxacin 500mg twice a day for 5 days.

## 2018-10-19 NOTE — DISCHARGE NOTE ADULT - MEDICATION SUMMARY - MEDICATIONS TO TAKE
I will START or STAY ON the medications listed below when I get home from the hospital:    aspirin 81 mg oral tablet, chewable  -- 1 tab(s) by mouth once a day  -- Indication: For Atrial fibrillation    metoprolol tartrate 25 mg oral tablet  -- 1 tab(s) by mouth 2 times a day  -- Indication: For Atrial fibrillation    Cipro 500 mg oral tablet  -- 1 tab(s) by mouth 2 times a day   -- Avoid prolonged or excessive exposure to direct and/or artificial sunlight while taking this medication.  Check with your doctor before becoming pregnant.  Do not take dairy products, antacids, or iron preparations within one hour of this medication.  Finish all this medication unless otherwise directed by prescriber.  Medication should be taken with plenty of water.    -- Indication: For Urinary tract infection

## 2018-10-19 NOTE — DISCHARGE NOTE ADULT - PATIENT PORTAL LINK FT
You can access the [facility] Patient Portal, offered by [facility], by registering with the following website: [email or web address]

## 2018-10-19 NOTE — PROGRESS NOTE ADULT - SUBJECTIVE AND OBJECTIVE BOX
<<<RESIDENT DISCHARGE NOTE>>>     TRUMAN LEAL  MRN-[id number]    VITAL SIGNS:  T(F): 96 (10-19-18 @ 04:55), Max: 96.4 (10-18-18 @ 12:46)  HR: 78 (10-19-18 @ 04:55)  BP: 119/57 (10-19-18 @ 04:55)        PHYSICAL EXAMINATION:  General:  NAD  Head & Neck: Normocephalic, atraumatic.   Pulmonary: Clear to auscultation.   Cardiovascular: RRR.   Gastrointestinal/Abdomen & Pelvis: non tender, soft, not distended.   Neurologic/Motor:AAOx3. grossly intact.     TEST RESULTS:                        9.4    10.68 )-----------( 276      ( 18 Oct 2018 10:52 )             34.5       10-18    141  |  104  |  11  ----------------------------<  127<H>  4.5   |  24  |  0.7    Ca    8.6      18 Oct 2018 10:52  Mg     2.4     10-18    TPro  7.3  /  Alb  4.1  /  TBili  0.5  /  DBili  <0.2  /  AST  29  /  ALT  12  /  AlkPhos  98  10-17      FINAL DISCHARGE INTERVIEW:  Resident(s) Present: (Name:____Dr Demarco Bateman_________), RN Present: (Name:  __Merna________)    DISCHARGE MEDICATION RECONCILIATION  reviewed with Attending (Name:__Dr Morrissey_________)    DISPOSITION:   [ x ] Home,    [  ] Home with Visiting Nursing Services,   [    ]  SNF/ NH,    [   ] Acute Rehab (4A),   [   ] Other (Specify:_________)

## 2018-10-19 NOTE — DISCHARGE NOTE ADULT - PLAN OF CARE
resolution you came in with c/o confusion. UA was positive for UTI.   you were started on antibiotics with clinical improvement.   Please complete the course of antibiotic- Ciprofloxacin 500mg twice a day for 5 days. PLease follo ronniep with your PMD for further follow up. resolution, back to baseline you presented with confusion likely due to underlying UTI.  you responded clinically with antibiotics.   please complete your antibiotic course.

## 2018-10-25 DIAGNOSIS — I10 ESSENTIAL (PRIMARY) HYPERTENSION: ICD-10-CM

## 2018-10-25 DIAGNOSIS — I48.91 UNSPECIFIED ATRIAL FIBRILLATION: ICD-10-CM

## 2018-10-25 DIAGNOSIS — N39.0 URINARY TRACT INFECTION, SITE NOT SPECIFIED: ICD-10-CM

## 2018-10-25 DIAGNOSIS — E78.5 HYPERLIPIDEMIA, UNSPECIFIED: ICD-10-CM

## 2018-10-25 DIAGNOSIS — K59.00 CONSTIPATION, UNSPECIFIED: ICD-10-CM

## 2018-10-25 DIAGNOSIS — G93.41 METABOLIC ENCEPHALOPATHY: ICD-10-CM

## 2018-10-25 DIAGNOSIS — Z86.73 PERSONAL HISTORY OF TRANSIENT ISCHEMIC ATTACK (TIA), AND CEREBRAL INFARCTION WITHOUT RESIDUAL DEFICITS: ICD-10-CM

## 2018-10-25 DIAGNOSIS — D50.9 IRON DEFICIENCY ANEMIA, UNSPECIFIED: ICD-10-CM

## 2019-03-28 ENCOUNTER — INPATIENT (INPATIENT)
Facility: HOSPITAL | Age: 84
LOS: 4 days | Discharge: NURSING, REHAB OR LONG-TERM CARE | End: 2019-04-02
Attending: INTERNAL MEDICINE | Admitting: INTERNAL MEDICINE
Payer: COMMERCIAL

## 2019-03-28 VITALS
DIASTOLIC BLOOD PRESSURE: 86 MMHG | SYSTOLIC BLOOD PRESSURE: 149 MMHG | TEMPERATURE: 98 F | OXYGEN SATURATION: 99 % | HEART RATE: 123 BPM | RESPIRATION RATE: 20 BRPM

## 2019-03-28 DIAGNOSIS — Z96.7 PRESENCE OF OTHER BONE AND TENDON IMPLANTS: Chronic | ICD-10-CM

## 2019-03-28 DIAGNOSIS — Z90.49 ACQUIRED ABSENCE OF OTHER SPECIFIED PARTS OF DIGESTIVE TRACT: Chronic | ICD-10-CM

## 2019-03-28 PROBLEM — K21.9 GASTRO-ESOPHAGEAL REFLUX DISEASE WITHOUT ESOPHAGITIS: Chronic | Status: ACTIVE | Noted: 2018-10-17

## 2019-03-28 PROBLEM — E78.5 HYPERLIPIDEMIA, UNSPECIFIED: Chronic | Status: ACTIVE | Noted: 2018-10-17

## 2019-03-28 PROBLEM — K22.2 ESOPHAGEAL OBSTRUCTION: Chronic | Status: ACTIVE | Noted: 2018-10-17

## 2019-03-28 PROBLEM — I10 ESSENTIAL (PRIMARY) HYPERTENSION: Chronic | Status: ACTIVE | Noted: 2018-10-17

## 2019-03-28 PROBLEM — K57.90 DIVERTICULOSIS OF INTESTINE, PART UNSPECIFIED, WITHOUT PERFORATION OR ABSCESS WITHOUT BLEEDING: Chronic | Status: ACTIVE | Noted: 2018-10-17

## 2019-03-28 PROBLEM — S72.009A FRACTURE OF UNSPECIFIED PART OF NECK OF UNSPECIFIED FEMUR, INITIAL ENCOUNTER FOR CLOSED FRACTURE: Chronic | Status: ACTIVE | Noted: 2018-10-17

## 2019-03-28 PROBLEM — I48.91 UNSPECIFIED ATRIAL FIBRILLATION: Chronic | Status: ACTIVE | Noted: 2018-10-17

## 2019-03-28 PROBLEM — I63.9 CEREBRAL INFARCTION, UNSPECIFIED: Chronic | Status: ACTIVE | Noted: 2018-10-17

## 2019-03-28 LAB
ALBUMIN SERPL ELPH-MCNC: 4 G/DL — SIGNIFICANT CHANGE UP (ref 3.5–5.2)
ALP SERPL-CCNC: 102 U/L — SIGNIFICANT CHANGE UP (ref 30–115)
ALT FLD-CCNC: 26 U/L — SIGNIFICANT CHANGE UP (ref 0–41)
ANION GAP SERPL CALC-SCNC: 14 MMOL/L — SIGNIFICANT CHANGE UP (ref 7–14)
APPEARANCE UR: ABNORMAL
AST SERPL-CCNC: 52 U/L — HIGH (ref 0–41)
BASOPHILS # BLD AUTO: 0.05 K/UL — SIGNIFICANT CHANGE UP (ref 0–0.2)
BASOPHILS NFR BLD AUTO: 0.5 % — SIGNIFICANT CHANGE UP (ref 0–1)
BILIRUB SERPL-MCNC: 0.7 MG/DL — SIGNIFICANT CHANGE UP (ref 0.2–1.2)
BILIRUB UR-MCNC: NEGATIVE — SIGNIFICANT CHANGE UP
BUN SERPL-MCNC: 18 MG/DL — SIGNIFICANT CHANGE UP (ref 10–20)
CALCIUM SERPL-MCNC: 9 MG/DL — SIGNIFICANT CHANGE UP (ref 8.5–10.1)
CHLORIDE SERPL-SCNC: 107 MMOL/L — SIGNIFICANT CHANGE UP (ref 98–110)
CO2 SERPL-SCNC: 21 MMOL/L — SIGNIFICANT CHANGE UP (ref 17–32)
COLOR SPEC: YELLOW — SIGNIFICANT CHANGE UP
CREAT SERPL-MCNC: 0.6 MG/DL — LOW (ref 0.7–1.5)
DIFF PNL FLD: ABNORMAL
EOSINOPHIL # BLD AUTO: 0.02 K/UL — SIGNIFICANT CHANGE UP (ref 0–0.7)
EOSINOPHIL NFR BLD AUTO: 0.2 % — SIGNIFICANT CHANGE UP (ref 0–8)
GLUCOSE SERPL-MCNC: 123 MG/DL — HIGH (ref 70–99)
GLUCOSE UR QL: NEGATIVE MG/DL — SIGNIFICANT CHANGE UP
HCT VFR BLD CALC: 36.8 % — LOW (ref 37–47)
HGB BLD-MCNC: 9.8 G/DL — LOW (ref 12–16)
IMM GRANULOCYTES NFR BLD AUTO: 0.5 % — HIGH (ref 0.1–0.3)
KETONES UR-MCNC: NEGATIVE — SIGNIFICANT CHANGE UP
LEUKOCYTE ESTERASE UR-ACNC: ABNORMAL
LYMPHOCYTES # BLD AUTO: 0.48 K/UL — LOW (ref 1.2–3.4)
LYMPHOCYTES # BLD AUTO: 5.2 % — LOW (ref 20.5–51.1)
MCHC RBC-ENTMCNC: 19.3 PG — LOW (ref 27–31)
MCHC RBC-ENTMCNC: 26.6 G/DL — LOW (ref 32–37)
MCV RBC AUTO: 72.6 FL — LOW (ref 81–99)
MONOCYTES # BLD AUTO: 0.59 K/UL — SIGNIFICANT CHANGE UP (ref 0.1–0.6)
MONOCYTES NFR BLD AUTO: 6.4 % — SIGNIFICANT CHANGE UP (ref 1.7–9.3)
NEUTROPHILS # BLD AUTO: 8.07 K/UL — HIGH (ref 1.4–6.5)
NEUTROPHILS NFR BLD AUTO: 87.2 % — HIGH (ref 42.2–75.2)
NITRITE UR-MCNC: NEGATIVE — SIGNIFICANT CHANGE UP
NRBC # BLD: 0 /100 WBCS — SIGNIFICANT CHANGE UP (ref 0–0)
PH UR: 6 — SIGNIFICANT CHANGE UP (ref 5–8)
PLATELET # BLD AUTO: 220 K/UL — SIGNIFICANT CHANGE UP (ref 130–400)
POTASSIUM SERPL-MCNC: 5.9 MMOL/L — HIGH (ref 3.5–5)
POTASSIUM SERPL-SCNC: 5.9 MMOL/L — HIGH (ref 3.5–5)
PROT SERPL-MCNC: 7.2 G/DL — SIGNIFICANT CHANGE UP (ref 6–8)
PROT UR-MCNC: 100 MG/DL
RBC # BLD: 5.07 M/UL — SIGNIFICANT CHANGE UP (ref 4.2–5.4)
RBC # FLD: 18.2 % — HIGH (ref 11.5–14.5)
RBC CASTS # UR COMP ASSIST: ABNORMAL /HPF
SODIUM SERPL-SCNC: 142 MMOL/L — SIGNIFICANT CHANGE UP (ref 135–146)
SP GR SPEC: 1.02 — SIGNIFICANT CHANGE UP (ref 1.01–1.03)
UROBILINOGEN FLD QL: 1 MG/DL (ref 0.2–0.2)
WBC # BLD: 9.26 K/UL — SIGNIFICANT CHANGE UP (ref 4.8–10.8)
WBC # FLD AUTO: 9.26 K/UL — SIGNIFICANT CHANGE UP (ref 4.8–10.8)
WBC UR QL: >50 /HPF

## 2019-03-28 RX ORDER — METOPROLOL TARTRATE 50 MG
5 TABLET ORAL ONCE
Qty: 0 | Refills: 0 | Status: COMPLETED | OUTPATIENT
Start: 2019-03-28 | End: 2019-03-28

## 2019-03-28 RX ORDER — METOPROLOL TARTRATE 50 MG
2.5 TABLET ORAL ONCE
Qty: 0 | Refills: 0 | Status: COMPLETED | OUTPATIENT
Start: 2019-03-28 | End: 2019-03-28

## 2019-03-28 RX ORDER — CEFTRIAXONE 500 MG/1
1 INJECTION, POWDER, FOR SOLUTION INTRAMUSCULAR; INTRAVENOUS EVERY 24 HOURS
Qty: 0 | Refills: 0 | Status: DISCONTINUED | OUTPATIENT
Start: 2019-03-28 | End: 2019-04-02

## 2019-03-28 RX ORDER — ALTEPLASE 100 MG
4.4 KIT INTRAVENOUS ONCE
Qty: 0 | Refills: 0 | Status: COMPLETED | OUTPATIENT
Start: 2019-03-28 | End: 2019-03-28

## 2019-03-28 RX ORDER — PANTOPRAZOLE SODIUM 20 MG/1
40 TABLET, DELAYED RELEASE ORAL
Qty: 0 | Refills: 0 | Status: DISCONTINUED | OUTPATIENT
Start: 2019-03-28 | End: 2019-04-02

## 2019-03-28 RX ORDER — DIPHENHYDRAMINE HCL 50 MG
25 CAPSULE ORAL ONCE
Qty: 0 | Refills: 0 | Status: COMPLETED | OUTPATIENT
Start: 2019-03-28 | End: 2019-03-28

## 2019-03-28 RX ORDER — ATORVASTATIN CALCIUM 80 MG/1
40 TABLET, FILM COATED ORAL AT BEDTIME
Qty: 0 | Refills: 0 | Status: DISCONTINUED | OUTPATIENT
Start: 2019-03-28 | End: 2019-03-29

## 2019-03-28 RX ORDER — METOPROLOL TARTRATE 50 MG
25 TABLET ORAL
Qty: 0 | Refills: 0 | Status: DISCONTINUED | OUTPATIENT
Start: 2019-03-28 | End: 2019-03-29

## 2019-03-28 RX ORDER — ALTEPLASE 100 MG
40 KIT INTRAVENOUS ONCE
Qty: 0 | Refills: 0 | Status: COMPLETED | OUTPATIENT
Start: 2019-03-28 | End: 2019-03-28

## 2019-03-28 RX ADMIN — Medication 5 MILLIGRAM(S): at 20:10

## 2019-03-28 RX ADMIN — Medication 2.5 MILLIGRAM(S): at 17:10

## 2019-03-28 RX ADMIN — Medication 25 MILLIGRAM(S): at 17:33

## 2019-03-28 RX ADMIN — ALTEPLASE 40 MILLIGRAM(S): KIT at 15:58

## 2019-03-28 RX ADMIN — Medication 2.5 MILLIGRAM(S): at 18:24

## 2019-03-28 RX ADMIN — CEFTRIAXONE 100 GRAM(S): 500 INJECTION, POWDER, FOR SOLUTION INTRAMUSCULAR; INTRAVENOUS at 19:56

## 2019-03-28 RX ADMIN — ALTEPLASE 264 MILLIGRAM(S): KIT at 15:56

## 2019-03-28 NOTE — ED PROVIDER NOTE - PHYSICAL EXAMINATION
Constitutional: Well developed, well nourished. NAD  TRAUMA: ABC intact. GCS 15.  Head: Normocephalic, atraumatic.  Eyes: PERRL. EOMI. No Raccoon eyes.   ENT: No nasal discharge. No septal hematoma. No Ortega sign. Mucous membranes moist.  Neck: Supple. Painless ROM. No midline tenderness, stepoffs.  Cardiovascular: Normal S1, S2. Regular rate and rhythm. No murmurs, rubs, or gallops.  Pulmonary: Normal respiratory rate and effort. Lungs clear to auscultation bilaterally. No wheezing, rales, or rhonchi.  CHEST: No chest wall tenderness, crepitus.  Abdominal: Soft. Nondistended. Nontender. No rebound, guarding, rigidity.  BACK: No midline T/L/S tenderness, stepoffs. No saddle paresthesia.  Extremities. Pelvis stable. left elbow abrasion. Right knee abrasions.   Skin: No rashes, cyanosis, lacerations, abrasions.  Neuro: AAOx3. left sided weakness- slurred speech. NIHSS 12.   Psych: Normal mood. Normal affect.

## 2019-03-28 NOTE — H&P ADULT - NSICDXPASTMEDICALHX_GEN_ALL_CORE_FT
PAST MEDICAL HISTORY:  Atrial fibrillation     Diverticulosis     Esophageal stricture     GERD (gastroesophageal reflux disease)     Hip fracture     Hyperlipidemia     Hypertension     Stroke no residual defecits

## 2019-03-28 NOTE — H&P ADULT - ASSESSMENT
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    # left sided weakness     ischemic stroke, initial NIHSS was 12     s/p TPA     neurology on board     target systolic < 180     neurovitals q1 hourly     will repeat ct head in the morning     check echocardiogram     TSH, VITAMIN B12     hba1c, lipid profile     speech and swallow evaluation    # positive urinalysis     pt is not a good historian has recurrent UTI in the past     will cover with ceftriaxone     follow blood and urine culture           # aFIB     HOLD ON XARELTO     started on metoprolol for rate control    # DLD     will check lipid profile     start atorvastatin 40 mg po daily    # anemia microcytic     will check peripheral smear, LDH, ferritin, iron TIBC    # hepatomegaly     etiology ?     new finding from last CT sca on october     will check abdominal sonogram     pulmonary nodules and lymph nodes ??      # ascending aortic dilatation 4.2 cm     need follow up as outpt      # DVT prophylaxis sequential compression    # diet NPO for now, need speech and swallow assessment    # fall precaution, bed rest for now

## 2019-03-28 NOTE — ED PROVIDER NOTE - CLINICAL SUMMARY MEDICAL DECISION MAKING FREE TEXT BOX
pt here for acute stroke, trauma. trauma eval only sig for L elbow abrasion. panscan negative for acute findings. given tpa, admit to stroke unit

## 2019-03-28 NOTE — H&P ADULT - NSICDXPASTSURGICALHX_GEN_ALL_CORE_FT
PAST SURGICAL HISTORY:  S/P cholecystectomy     S/P ORIF (open reduction internal fixation) fracture left hip

## 2019-03-28 NOTE — CONSULT NOTE ADULT - ASSESSMENT
89 yo female with pmh as stated with left sided weakness and facial droop. patient was a tPA candidate. Bolus was given at 3.56pm with significant improvement in ss  Most likely Right MCA stroke    Plan:  BP control <180/105   CTP  Frequent neurochekcs post tPA protocol  Watch for bleeding  No antiplatelets or anticoagulants  Repeat CTH in 24 hrs  ICU monitoring      Neuroattending note will follow 89 yo female with pmh as stated with left sided weakness and facial droop. patient was a tPA candidate. Bolus was given at 3.56pm with significant improvement in ss  Most likely Right MCA stroke    Plan:  BP control <180/105   CTA head and neck  Frequent neurochekcs post tPA protocol  Watch for bleeding  No antiplatelets or anticoagulants  Repeat CTH in 24 hrs  ICU monitoring      Neuroattending note will follow

## 2019-03-28 NOTE — ED PROVIDER NOTE - ATTENDING CONTRIBUTION TO CARE
89 yo f hx htn, afib on xarelto (noncompliant per family)  family has cameras in her house where she lives independtly  at 11:30 they say her going partially down stairs  they did not see images later so they came to see her when they found her on the kitchen floor.   they noticed L sided weakness, slurred speech so they called ems  FS in field 130s, pt in afib    Stroke Code called as prenotification  Pt w/ LUE weakness, LLE weakness, slurred speech, facial droop  NIHSS 12  Trauma Alert called for fall on possible xarelto, daughter later stated pt has definitely not taken any med for 2 months    Trauma alert called for   VS  A: intact, protected  B: breath sounds equal b/l, no cyanosis  C: extremities warm and well perfused  D: GCS 15      H: NCAT, L facial droop  T: oropharynx clear  Card: RRR, nml s1s2  Pulm: CTAB, breath sounds equal  Abd: S, NT, ND  Spine: no C/T/L spine TTP, collar in place  Pelvis: stable  Extremities: no gross deformities  Neuro: Awake, alert, LUE w/o movement, LLE w/ minimal movement but cannot against gravity    NIHSS 12    Plan:  STAT CTH stroke protocol negative for bleed  detailed risk/ benefit discussion had w/ family regarding risk of bleed as pt in 3-4.5 hr window and 89 yo  family consented to tPA  Per neuro- will want ct perfusion after tPA  Pt dry scanned- ctcs, chest, abdo pelvis to assess for bleed which would contra-indicate tPA  discussed w/ rad resident prior to tPA- no obvious bleed but as study noncon, limited  will discuss repeat panscan w/ IV con w/ trauma team  will discuss CT perfusion w/ neuro

## 2019-03-28 NOTE — CONSULT NOTE ADULT - SUBJECTIVE AND OBJECTIVE BOX
TRUMAN LEAL    Chief Complaint:    Handed    HPI: 89 yo female with pmh of Afib, non compliant with Xarelto and Metoprolol presents with acute onset of left sided weakness left arm>left leg, left facial and left visual cut. Last well known was seen on the camera at 11.34 am as per daughter In ED stroke code was called, initial NIHSS is 12.  CTH was negative for bleeding, patient was within tPA window.  tPA bolus administered at 3.56 pm bolus  Patient has significant improvement in symptoms Left arm is moving NIHSS dropped 12-4.      Relevant PMH:  [] Prior ischemic stroke/TIA  [] Afib  []CAD  []HTN  []DLD  []DM []PVD []Obesity [] Sedintary lifestyle []CHF  []MICHELLE  []Cancer Hx     Social History: [] Smoking []  Drug Use: []   Alcohol Use:   [] Other:      Possible Location of Stroke: right MCA    Possible Cause of Stroke: ischemic    Relevant Cerebral Imaging:    Relevant Cervicocerebral Imaging:          Relevant blood tests:      Relevant cardiac rhythm monitoring:    Relevant Cardiac Structure:(TTE/KENNETH +/-):[]No intracardiac thrombus/[] no vegetation/[]no [place]ia/EF:      Home Medications:  aspirin 81 mg oral tablet, chewable: 1 tab(s) orally once a day (19 Oct 2018 11:36)  metoprolol tartrate 25 mg oral tablet: 1 tab(s) orally 2 times a day (19 Oct 2018 11:36)      MEDICATIONS  (STANDING):  alteplase    Bolus 4.4 milliGRAM(s) IV Bolus once  alteplase    IVPB 40 milliGRAM(s) IV Intermittent once  metoprolol tartrate Injectable 2.5 milliGRAM(s) IV Push Once      PT/OT/Speech/Rehab/S&Swr:    Exam:    Vital Signs Last 24 Hrs  T(C): --  T(F): --  HR: 130 (28 Mar 2019 16:31) (113 - 130)  BP: 183/99 (28 Mar 2019 16:31) (117/81 - 183/99)  BP(mean): --  RR: 17 (28 Mar 2019 16:31) (16 - 20)  SpO2: 97% (28 Mar 2019 16:31) (95% - 99%)    NIHSS      LOC:       1a:    0 1b(Questions):   0        1c(Instructions):    0         Best Gaze:0  Visual:1  Motor:                 RUE:  0   RLE: 0    LUE:1     LLE:1     FACE:   1  Limb Ataxia:0  Sensory:    0   Language:  0     Dysarthria:  0        Extinction and Inattention:0    NIHSS on admission:          NIHSS yesterday:          NIHSS today:   4          m-RS: 3    Impression:      Suggestion:  Routine stroke workup including:    Disposition: TRUMAN LEAL    Chief Complaint:    Handed    HPI: 89 yo female with pmh of Afib, non compliant with Xarelto and Metoprolol presents with acute onset of left sided weakness left arm>left leg, left facial and left visual cut. Last well known was seen on the camera at 11.34 am as per daughter In ED stroke code was called, initial NIHSS is 12.  CTH was negative for bleeding, patient was within tPA window.  tPA bolus administered at 3:56 pm bolus  Patient has significant improvement in symptoms Left arm is moving NIHSS dropped 12-4.      Relevant PMH:  [] Prior ischemic stroke/TIA  [] Afib  []CAD  []HTN  []DLD  []DM []PVD []Obesity [] Sedintary lifestyle []CHF  []MICHELLE  []Cancer Hx     Social History: [] Smoking []  Drug Use: []   Alcohol Use:   [] Other:      Possible Location of Stroke: right MCA    Possible Cause of Stroke: ischemic    Relevant Cerebral Imaging:    Relevant Cervicocerebral Imaging:          Relevant blood tests:      Relevant cardiac rhythm monitoring:    Relevant Cardiac Structure:(TTE/KENNETH +/-):[]No intracardiac thrombus/[] no vegetation/[]no [place]ia/EF:      Home Medications:  aspirin 81 mg oral tablet, chewable: 1 tab(s) orally once a day (19 Oct 2018 11:36)  metoprolol tartrate 25 mg oral tablet: 1 tab(s) orally 2 times a day (19 Oct 2018 11:36)      MEDICATIONS  (STANDING):  alteplase    Bolus 4.4 milliGRAM(s) IV Bolus once  alteplase    IVPB 40 milliGRAM(s) IV Intermittent once  metoprolol tartrate Injectable 2.5 milliGRAM(s) IV Push Once      PT/OT/Speech/Rehab/S&Swr:    Exam:    Vital Signs Last 24 Hrs  T(C): --  T(F): --  HR: 130 (28 Mar 2019 16:31) (113 - 130)  BP: 183/99 (28 Mar 2019 16:31) (117/81 - 183/99)  BP(mean): --  RR: 17 (28 Mar 2019 16:31) (16 - 20)  SpO2: 97% (28 Mar 2019 16:31) (95% - 99%)    NIHSS      LOC:       1a:    0 1b(Questions):   0        1c(Instructions):    0         Best Gaze:0  Visual:1  Motor:                 RUE:  0   RLE: 0    LUE:1     LLE:1     FACE:   1  Limb Ataxia:0  Sensory:    0   Language:  0     Dysarthria:  0        Extinction and Inattention:0    NIHSS on admission:          NIHSS yesterday:          NIHSS today:   4          m-RS: 3    Impression:      Suggestion:  Routine stroke workup including:    Disposition:

## 2019-03-28 NOTE — CONSULT NOTE ADULT - ATTENDING COMMENTS
Patient seen and examined with PA during stroke code.  Patient was seen walking on camera prior to 11:30 and at around 11:30 was noticed to be on the floor.  It was unclear whether she fell and she had an abrasion on her left forearm.  As the last time she was seen walking on camera was within 4.5 hours I discussed with daughter about TPA.  Prior to receiving TPA she required clearance by trauma to ensure that there was no active internal bleeding.  The stat read of the CT Chest A/P was no obvious hemorrhage but IV contrast would be required for reliable read.  She was examined and no obvious site of injury was seen and patient stated that she leaned against the wall and did not fall.  I discussed also with daughter that we can give tpa in the extended 3-4.5 hour window but the risk of hemorrhage maybe higher given this current presentation.  After going over the risks benefits and alternatives the daughter signed consent for TPA with me and witnessed by Nurse Practitioner Jose Dyson.    She was re-examined after 1 hour and she was now moving her left extremitities and appears to be confused and getting agitated and wants to go home.    Plan  1. Admit to ICU  2. Continue post TPA neurochecks and vitals  3. Call STAT for any worsening in neuroexam and repeat CTH STAT  4. Repeat CTH in 12-24 hours  5. Keep Blood pressure within range <180/100  6. CTA H+N  7. F/u all labs and urine
CVA with fall   s/p TPA   admit to MICU   will f/u

## 2019-03-28 NOTE — CONSULT NOTE ADULT - SUBJECTIVE AND OBJECTIVE BOX
TRAUMA ACTIVATION LEVEL:  2, alert    MECHANISM OF INJURY:      [x] Blunt  	[] MVC	[x]possible Fall	[] Pedestrian Struck	[] Motorcycle   [] Assault   [] Bicycle collision  [] Sports injury     [] Penetrating  	[] Gun Shot Wound 		[] Stab Wound    GCS:14-15  	E: 4	V: 4, 5	M: 6    89y old F with PMH Afib (not compliant with Xarelto since 2018 per daughter RN), stroke without deficit, HTN, HLD, CHF, UTI 10/2018, uterine prolapse, diverticulosis, GERD, esophageal stricture, hip fracture s/p L hip ORIF. She was her normal self yesterday, and her family noted on in home camera system that she was not seen on the cameras after 1130am. They then saw her lying on the kitchen floor (not near a staircase) and when the family went to the house at 2pm she was sitting up on the floor which they noted to be slippery, and speaking. They denied any blood, emesis, or urine on the floor. She was confused and had left sided weakness /hemiparalysis to the upper and lower extremities with no sensation and left facial droop.    She has also had BL lower leg swelling but has been refusing to take Lasix that the daughter offered.   Neurology evaluated the patient and recommended TPA, so she had a CT noncon as she is planned to have a CT perfusion after TPA administration and wish to avoid extra contrast load.       PAST MEDICAL & SURGICAL HISTORY:  Esophageal stricture  Hip fracture L s/p ORIF   GERD (gastroesophageal reflux disease)  Diverticulosis  Hyperlipidemia  Hypertension   UTI/cystitis 10/2018  Stroke: no residual deficits  Atrial fibrillation, no longer taking Xarelto though recommended to continue on last hospitalization 10/2018 for Chadsvasc3  S/P ORIF (open reduction internal fixation) fracture: left hip  S/P cholecystectomy      Allergies    No Known Allergies    Intolerances        Home Medications:  aspirin 81 mg oral tablet, chewable: 1 tab(s) orally once a day (19 Oct 2018 11:36)  metoprolol tartrate 25 mg oral tablet: 1 tab(s) orally 2 times a day (19 Oct 2018 11:36)      ROS: 10-system review is otherwise negative except HPI above.      Primary Survey:    A - airway intact  B - bilateral breath sounds and good chest rise  C - palpable pulses in all extremities  D - GCS 15 on arrival, MICHAEL  Exposure obtained    Vital Signs Last 24 Hrs  T(C): 36.7 (28 Mar 2019 17:46), Max: 36.7 (28 Mar 2019 15:30)  T(F): 98.1 (28 Mar 2019 17:46), Max: 98.1 (28 Mar 2019 17:31)  HR: 128 (28 Mar 2019 18:30) (104 - 158)  BP: 135/86 (28 Mar 2019 18:30) (114/84 - 183/99)  BP(mean): --  RR: 18 (28 Mar 2019 18:30) (16 - 20)  SpO2: 98% (28 Mar 2019 18:30) (95% - 99%)    Secondary Survey:   General: NAD  HEENT: Normocephalic, atraumatic, EOMI, PEERLA. no scalp lacerations   Neck: Soft, midline trachea. no cspine tenderness  Chest: No chest wall tenderness. or subq  emphysema   Cardiac: S1, S2, RRR  Respiratory: Bilateral breath sounds, clear and equal bilaterally  Abdomen: Soft, non-distended, non-tender, no rebound,   Groin: Normal appearing, pelvis stable   Ext: palp radial b/l UE, b/l DP palp in Lower Extrem.   Back: no TTP, no palpable runoff/stepoff/deformity  Rectal: No evonne blood, ALEXANDRA with good tone    FAST    Procedures:    LABS:  Labs:  CAPILLARY BLOOD GLUCOSE      POCT Blood Glucose.: 107 mg/dL (28 Mar 2019 15:16)                          9.8    9.26  )-----------( 220      ( 28 Mar 2019 15:30 )             36.8       Auto Immature Granulocyte %: 0.5 % (19 @ 15:30)  Auto Neutrophil %: 87.2 % (19 @ 15:30)        142  |  107  |  18  ----------------------------<  123<H>  5.9<H>   |  21  |  0.6<L>      Calcium, Total Serum: 9.0 mg/dL (19 @ 15:30)      LFTs:             7.2  | 0.7  | 52       ------------------[102     ( 28 Mar 2019 15:30 )  4.0  | x    | 26          Lipase:x      Amylase:x             Coags:            Urinalysis Basic - ( 28 Mar 2019 17:20 )    Color: Yellow / Appearance: Turbid / S.025 / pH: x  Gluc: x / Ketone: Negative  / Bili: Negative / Urobili: 1.0 mg/dL   Blood: x / Protein: 100 mg/dL / Nitrite: Negative   Leuk Esterase: Large / RBC: 26-50 /HPF / WBC >50 /HPF   Sq Epi: x / Non Sq Epi: x / Bacteria: x                RADIOLOGY & ADDITIONAL STUDIES: TRAUMA ACTIVATION LEVEL:  2, alert    MECHANISM OF INJURY:      [x] Blunt  	[] MVC	[x]possible Fall	[] Pedestrian Struck	[] Motorcycle   [] Assault   [] Bicycle collision  [] Sports injury     [] Penetrating  	[] Gun Shot Wound 		[] Stab Wound    GCS:14-15  	E: 4	V: 4, 5	M: 6    89y old F with PMH Afib (not compliant with Xarelto since 2018 per daughter RN), stroke without deficit, HTN, HLD, CHF, UTI 10/2018, uterine prolapse, diverticulosis, GERD, esophageal stricture, hip fracture s/p L hip ORIF. She was her normal self yesterday, and her family noted on in home camera system that she was not seen on the cameras after 1130am. They then saw her lying on the kitchen floor (not near a staircase) and when the family went to the house at 2pm she was sitting up on the floor which they noted to be slippery, and speaking. They denied any blood, emesis, or urine on the floor. She was confused and had left sided weakness /hemiparalysis to the upper and lower extremities with no sensation and left facial droop.    She has also had BL lower leg swelling but has been refusing to take Lasix that the daughter offered.  Patient unreliable history- denies any fall, headstrike or loss of consciousness, not taking anticoagulants.   Neurology evaluated the patient and recommended TPA, so she had a CT noncon as she is planned to have a CT perfusion after TPA administration and wish to avoid extra contrast load.       PAST MEDICAL & SURGICAL HISTORY:  Esophageal stricture  Hip fracture L s/p ORIF   GERD (gastroesophageal reflux disease)  Diverticulosis  Hyperlipidemia  Hypertension   diverticulosis  UTI/cystitis 10/2018  Stroke: no residual deficits  Atrial fibrillation, no longer taking Xarelto though recommended to continue on last hospitalization 10/2018 for Chadsvasc3  S/P ORIF (open reduction internal fixation) fracture: left hip  S/P cholecystectomy  s/p right shoulder surgery       Allergies  No Known Allergies        Home Medications:  NOT TAKING ANY MEDICATIONS - recommended to have Xarelto, metoprolol, aspirin on prior admissions   per daughter and patient agrees she is not compliant with taking Xarelto     ROS: 10-system review is otherwise negative except HPI above.      Primary Survey:    A - airway intact, speaking  B - bilateral breath sounds and good chest rise  C - palpable pulses in all extremities  D - GCS 15 on arrival, MICHAEL  Exposure obtained    Vital Signs Last 24 Hrs  T(C): 36.7 (28 Mar 2019 17:46), Max: 36.7 (28 Mar 2019 15:30)  T(F): 98.1 (28 Mar 2019 17:46), Max: 98.1 (28 Mar 2019 17:31)  HR: 128 (28 Mar 2019 18:30) (104 - 158)  BP: 135/86 (28 Mar 2019 18:30) (114/84 - 183/99)  BP(mean): --  RR: 18 (28 Mar 2019 18:30) (16 - 20)  SpO2: 98% (28 Mar 2019 18:30) (95% - 99%)    Secondary Survey:   General: NAD, left facial droop, left sided weakness and loss of sensation on left. Hard of hearing.   HEENT: Normocephalic, atraumatic. C-collar placed. Hard of hearing, possible left sided neglect- less likely to answer when called on left or looking to left.  EOMI, PEERL. No scalp lacerations   Neck: Soft, midline trachea. no cspine tenderness, C-collar in place  Chest: No chest wall tenderness, ecchymosis, or subq emphysema   Cardiac: S1, S2, irregular   Respiratory: Bilateral breath sounds, clear and equal bilaterally  Abdomen: Soft, non-distended, non-tender, no rebound, no guarding  Groin: Normal appearing, pelvis stable   Ext: Palpable BL LE DP pulses. Able to lift right leg, weakness in LLE but able to move toes on first exam, improved on re-exam post TPA. Left arm with skin tears on forearm and ecchymosis, denies any tenderness or pain. Able to lift and extend arm post TPA, prior had delayed and weakened left hand grasp. RUE intact sensation and motor.    Back: no TTP, no palpable runoff/stepoff/deformity, no ecchymosis, minimal small scratch in low back.   Rectal: No evonne blood, ALEXANDRA with good tone    LABS:  CAPILLARY BLOOD GLUCOSE  POCT Blood Glucose.: 107 mg/dL (28 Mar 2019 15:16)                          9.8    9.26  )-----------( 220      ( 28 Mar 2019 15:30 )             36.8       Auto Immature Granulocyte %: 0.5 % (19 @ 15:30)  Auto Neutrophil %: 87.2 % (19 @ 15:30)        142  |  107  |  18  ----------------------------<  123<H>  5.9<H>   |  21  |  0.6<L>      Calcium, Total Serum: 9.0 mg/dL (19 @ 15:30)      LFTs:             7.2  | 0.7  | 52       ------------------[102     ( 28 Mar 2019 15:30 )  4.0  | x    | 26          Lipase:x      Amylase:x             Coags:      Urinalysis Basic - ( 28 Mar 2019 17:20 )    Color: Yellow / Appearance: Turbid / S.025 / pH: x  Gluc: x / Ketone: Negative  / Bili: Negative / Urobili: 1.0 mg/dL   Blood: x / Protein: 100 mg/dL / Nitrite: Negative   Leuk Esterase: Large / RBC: 26-50 /HPF / WBC >50 /HPF   Sq Epi: x / Non Sq Epi: x / Bacteria: x      RADIOLOGY & ADDITIONAL STUDIES:  L knee:     CT brain stroke protocol: < from: CT Brain Stroke Protocol (19 @ 15:39) >  IMPRESSION:   1.  No CT evidence for acute intracranial hemorrhage or large territorial infarct.   2.  If symptoms continue to persist MRI of the head may be be helpful for further evaluation.    CTA head:     CT C-spine: < from: CT Cervical Spine No Cont (19 @ 15:45) >  IMPRESSION:  1.  No evidence of acute cervical spine fracture or subluxation.   2.  Diffuse osteopenia and moderate degenerative changes.      CT Chest/ abdomen/ pelvis:   < from: CT Chest No Cont (19 @ 15:53) >  IMPRESSION:   1.  No CT evidence for acute traumatic injury to the chest abdomen or pelvis.  2.  Small bilateral pleural effusions.   3.  Ascending aortic dilatation up to 4.2 cm.  4.  Dilated pulmonary artery, 3.7 cm. TRAUMA ACTIVATION LEVEL:  2, alert    MECHANISM OF INJURY:      [x] Blunt  	[] MVC	[x]possible Fall	[] Pedestrian Struck	[] Motorcycle   [] Assault   [] Bicycle collision  [] Sports injury     [] Penetrating  	[] Gun Shot Wound 		[] Stab Wound    GCS:14-15  	E: 4	V: 4, 5	M: 6    89y old F with PMH Afib (not compliant with Xarelto since 2018 per daughter RN), stroke without deficit, HTN, HLD, CHF, UTI 10/2018, uterine prolapse, diverticulosis, GERD, esophageal stricture, hip fracture s/p L hip ORIF. She was her normal self yesterday, and her family noted on in home camera system that she was not seen on the cameras after 1130am. They then saw her lying on the kitchen floor (not near a staircase) and when the family went to the house at 2pm she was sitting up on the floor which they noted to be slippery, and speaking. They denied any blood, emesis, or urine on the floor. She was confused and had left sided weakness /hemiparalysis to the upper and lower extremities with no sensation and left facial droop.    She has also had BL lower leg swelling but has been refusing to take Lasix that the daughter offered.  Patient unreliable history- denies any fall, headstrike or loss of consciousness, not taking anticoagulants.   Neurology evaluated the patient and recommended TPA, so she had a CT noncon as she is planned to have a CT perfusion after TPA administration and wish to avoid extra contrast load.       PAST MEDICAL & SURGICAL HISTORY:  Esophageal stricture  Hip fracture L s/p ORIF   GERD (gastroesophageal reflux disease)  Diverticulosis  Hyperlipidemia  Hypertension   diverticulosis  UTI/cystitis 10/2018  Stroke: no residual deficits  Atrial fibrillation, no longer taking Xarelto though recommended to continue on last hospitalization 10/2018 for Chadsvasc3  S/P ORIF (open reduction internal fixation) fracture: left hip  S/P cholecystectomy  s/p right shoulder surgery       Allergies  No Known Allergies        Home Medications:  NOT TAKING ANY MEDICATIONS - recommended to have Xarelto, metoprolol, aspirin on prior admissions   per daughter and patient agrees she is not compliant with taking Xarelto     ROS: 10-system review is otherwise negative except HPI above.      Primary Survey:    A - airway intact, speaking  B - bilateral breath sounds and good chest rise  C - palpable pulses in all extremities  D - GCS 15 on arrival, MICHAEL  Exposure obtained    Vital Signs Last 24 Hrs  T(C): 36.7 (28 Mar 2019 17:46), Max: 36.7 (28 Mar 2019 15:30)  T(F): 98.1 (28 Mar 2019 17:46), Max: 98.1 (28 Mar 2019 17:31)  HR: 128 (28 Mar 2019 18:30) (104 - 158)  BP: 135/86 (28 Mar 2019 18:30) (114/84 - 183/99)  BP(mean): --  RR: 18 (28 Mar 2019 18:30) (16 - 20)  SpO2: 98% (28 Mar 2019 18:30) (95% - 99%)    Secondary Survey:   General: NAD, left facial droop, left sided weakness and loss of sensation on left. Hard of hearing.   HEENT: Normocephalic, atraumatic. C-collar placed. Hard of hearing, possible left sided neglect- less likely to answer when called on left or looking to left.  EOMI, PEERL. No scalp lacerations   Neck: Soft, midline trachea. no cspine tenderness, C-collar in place  Chest: No chest wall tenderness, ecchymosis, or subq emphysema   Cardiac: S1, S2, irregular   Respiratory: Bilateral breath sounds, clear and equal bilaterally  Abdomen: Soft, non-distended, non-tender, no rebound, no guarding  Groin: Normal appearing, pelvis stable   Ext: Palpable BL LE DP pulses. Able to lift right leg, weakness in LLE but able to move toes on first exam, improved on re-exam post TPA. Left arm with skin tears on forearm and ecchymosis, denies any tenderness or pain. Able to lift and extend arm post TPA, prior had delayed and weakened left hand grasp. RUE intact sensation and motor.    Back: no TTP, no palpable runoff/stepoff/deformity, no ecchymosis, minimal small scratch in low back.   Rectal: No evonne blood, ALEXANDRA with good tone    LABS:  CAPILLARY BLOOD GLUCOSE  POCT Blood Glucose.: 107 mg/dL (28 Mar 2019 15:16)                          9.8    9.26  )-----------( 220      ( 28 Mar 2019 15:30 )             36.8       Auto Immature Granulocyte %: 0.5 % (19 @ 15:30)  Auto Neutrophil %: 87.2 % (19 @ 15:30)        142  |  107  |  18  ----------------------------<  123<H>  5.9<H>   |  21  |  0.6<L>      Calcium, Total Serum: 9.0 mg/dL (19 @ 15:30)      LFTs:             7.2  | 0.7  | 52       ------------------[102     ( 28 Mar 2019 15:30 )  4.0  | x    | 26          Lipase:x      Amylase:x             Coags:      Urinalysis Basic - ( 28 Mar 2019 17:20 )    Color: Yellow / Appearance: Turbid / S.025 / pH: x  Gluc: x / Ketone: Negative  / Bili: Negative / Urobili: 1.0 mg/dL   Blood: x / Protein: 100 mg/dL / Nitrite: Negative   Leuk Esterase: Large / RBC: 26-50 /HPF / WBC >50 /HPF   Sq Epi: x / Non Sq Epi: x / Bacteria: x      RADIOLOGY & ADDITIONAL STUDIES:  L knee:     CT brain stroke protocol: < from: CT Brain Stroke Protocol (19 @ 15:39) >  IMPRESSION:   1.  No CT evidence for acute intracranial hemorrhage or large territorial infarct.   2.  If symptoms continue to persist MRI of the head may be be helpful for further evaluation.    CTA head:     CT C-spine: < from: CT Cervical Spine No Cont (19 @ 15:45) >  IMPRESSION:  1.  No evidence of acute cervical spine fracture or subluxation.   2.  Diffuse osteopenia and moderate degenerative changes.      CT Chest/ abdomen/ pelvis:   < from: CT Chest No Cont (19 @ 15:53) >  IMPRESSION:   1.  No CT evidence for acute traumatic injury to the chest abdomen or pelvis.  2.  Small bilateral pleural effusions.   3.  Ascending aortic dilatation up to 4.2 cm.  4.  Dilated pulmonary artery, 3.7 cm.    < from: CT Angio Neck w/ IV Cont (19 @ 22:10) >    IMPRESSION:     No large vessel occlusion or significant stenosis of the vessels of the   head and neck.    < end of copied text >  < from: CT Angio Head w/ IV Cont (19 @ 22:09) >    IMPRESSION:     No large vessel occlusion or significant stenosis of the vessels of the   head and neck.    < end of copied text >

## 2019-03-28 NOTE — ED PROVIDER NOTE - PROGRESS NOTE DETAILS
pt rapidly improving w/ tPA, will d/w neuro and surg utility to them of CTH perfusion and CT Chest, AP w/ IV con Trauma requesting right knee xray. Trauma requesting left knee xray. Spoke to ICU resident, told them to follow up on the elbow and knee imaging.

## 2019-03-28 NOTE — ED PROVIDER NOTE - CARE PLAN
Principal Discharge DX:	Stroke  Secondary Diagnosis:	Trauma  Secondary Diagnosis:	Atrial fibrillation

## 2019-03-28 NOTE — H&P ADULT - NSHPPHYSICALEXAM_GEN_ALL_CORE
Vital Signs Last 24 Hrs  T(C): 36.6 (28 Mar 2019 17:56), Max: 36.7 (28 Mar 2019 15:30)  T(F): 97.9 (28 Mar 2019 17:56), Max: 98.1 (28 Mar 2019 17:31)  HR: 109 (28 Mar 2019 18:45) (104 - 158)  BP: 137/91 (28 Mar 2019 18:45) (114/84 - 183/99)  BP(mean): --  RR: 16 (28 Mar 2019 18:45) (16 - 20)  SpO2: 96% (28 Mar 2019 18:45) (95% - 99%)    alert oriented not in any acute distress  mild pale on exam  chest bilateral basal crackles  cvs s1 and s2 sinus tachycardia  abdomen soft lax hepatomegaly + bowel sound  mild pedal edema  left facial droop  left UL 3/5 and right UL 5/5  left LL 4/5 and right LL 5/5  babinski down going  reflexes 2+ bilaterally

## 2019-03-28 NOTE — ED PROVIDER NOTE - OBJECTIVE STATEMENT
Patient is a 89 yo F w/ hx of a-fib on xarelto (not complaint with medication per family), HTN, diverticulosis p/w left sided weakness since this AM. Patient was seen on home cameras walking well at 11:30, then no movement seen so family went to visit her, found her on the ground with left sided weakness. EMS alerted stroke code.

## 2019-03-28 NOTE — H&P ADULT - NSHPLABSRESULTS_GEN_ALL_CORE
03-28    142  |  107  |  18  ----------------------------<  123<H>  5.9<H>   |  21  |  0.6<L>    Ca    9.0      28 Mar 2019 15:30    TPro  7.2  /  Alb  4.0  /  TBili  0.7  /  DBili  x   /  AST  52<H>  /  ALT  26  /  AlkPhos  102  03-28                          9.8    9.26  )-----------( 220      ( 28 Mar 2019 15:30 )             36.8     : CT Abdomen and Pelvis No Cont (03.28.19 @ 15:54) >    IMPRESSION:   1.  No CT evidence for acute traumatic injury to the chest abdomen or   pelvis.  2.  Small bilateral pleural effusions.   3.  Ascending aortic dilatation up to 4.2 cm.  4.  Dilated pulmonary artery, 3.7 cm.    < from: CT Chest No Cont (03.28.19 @ 15:53) >      IMPRESSION:   1.  No CT evidence for acute traumatic injury to the chest abdomen or   pelvis.  2.  Small bilateral pleural effusions.   3.  Ascending aortic dilatation up to 4.2 cm.  4.  Dilated pulmonary artery, 3.7 cm.    < end of copied text >

## 2019-03-28 NOTE — CONSULT NOTE ADULT - ASSESSMENT
---------------------------------------------------------------------------------------    ASSESSMENT:  89y old f s/p    PLAN:    -  follow up official curt - L knee xray   -   -   -  d/w ---------------------------------------------------------------------------------------  ASSESSMENT:  89y old F found down  s/p possible fall, not on Xarelto, likely stroke with left facial droop and upper and lower extremities without sensation and hemiparalysis now s/p TPA with improvement     PLAN:    -  follow up official reads - L knee x-ray pending, CTA perfusion post TPA, Left elbow recommended and discussed with ED   CT head without noted infarct or hemorrhage, clinically with stroke- MRI if needed, likely too early. CT Cspine and C/A/P without a  - local wound care to left forearm skin tears   -   Trauma surgery will follow, plan for tertiary survey tomorrow.   -  admitted to MICU now s/p TPA, neuro exams   -  Discussed with Dr. Salazar

## 2019-03-29 LAB
ALBUMIN SERPL ELPH-MCNC: 3.7 G/DL — SIGNIFICANT CHANGE UP (ref 3.5–5.2)
ALP SERPL-CCNC: 117 U/L — HIGH (ref 30–115)
ALT FLD-CCNC: 27 U/L — SIGNIFICANT CHANGE UP (ref 0–41)
ANION GAP SERPL CALC-SCNC: 13 MMOL/L — SIGNIFICANT CHANGE UP (ref 7–14)
ANION GAP SERPL CALC-SCNC: 13 MMOL/L — SIGNIFICANT CHANGE UP (ref 7–14)
AST SERPL-CCNC: 48 U/L — HIGH (ref 0–41)
BILIRUB SERPL-MCNC: 0.6 MG/DL — SIGNIFICANT CHANGE UP (ref 0.2–1.2)
BUN SERPL-MCNC: 17 MG/DL — SIGNIFICANT CHANGE UP (ref 10–20)
BUN SERPL-MCNC: 18 MG/DL — SIGNIFICANT CHANGE UP (ref 10–20)
CALCIUM SERPL-MCNC: 8.4 MG/DL — LOW (ref 8.5–10.1)
CALCIUM SERPL-MCNC: 8.6 MG/DL — SIGNIFICANT CHANGE UP (ref 8.5–10.1)
CHLORIDE SERPL-SCNC: 104 MMOL/L — SIGNIFICANT CHANGE UP (ref 98–110)
CHLORIDE SERPL-SCNC: 109 MMOL/L — SIGNIFICANT CHANGE UP (ref 98–110)
CHOLEST SERPL-MCNC: 144 MG/DL — SIGNIFICANT CHANGE UP (ref 100–200)
CK SERPL-CCNC: 50 U/L — SIGNIFICANT CHANGE UP (ref 0–225)
CO2 SERPL-SCNC: 21 MMOL/L — SIGNIFICANT CHANGE UP (ref 17–32)
CO2 SERPL-SCNC: 21 MMOL/L — SIGNIFICANT CHANGE UP (ref 17–32)
CREAT SERPL-MCNC: 0.7 MG/DL — SIGNIFICANT CHANGE UP (ref 0.7–1.5)
CREAT SERPL-MCNC: 0.7 MG/DL — SIGNIFICANT CHANGE UP (ref 0.7–1.5)
ESTIMATED AVERAGE GLUCOSE: 131 MG/DL — HIGH (ref 68–114)
FERRITIN SERPL-MCNC: 27 NG/ML — SIGNIFICANT CHANGE UP (ref 15–150)
GLUCOSE SERPL-MCNC: 102 MG/DL — HIGH (ref 70–99)
GLUCOSE SERPL-MCNC: 69 MG/DL — LOW (ref 70–99)
HBA1C BLD-MCNC: 6.2 % — HIGH (ref 4–5.6)
HCT VFR BLD CALC: 32.4 % — LOW (ref 37–47)
HCT VFR BLD CALC: 35.8 % — LOW (ref 37–47)
HDLC SERPL-MCNC: 53 MG/DL — SIGNIFICANT CHANGE UP
HGB BLD-MCNC: 8.8 G/DL — LOW (ref 12–16)
HGB BLD-MCNC: 9.5 G/DL — LOW (ref 12–16)
IRON SATN MFR SERPL: 23 UG/DL — LOW (ref 35–150)
IRON SATN MFR SERPL: 7 % — LOW (ref 15–50)
LDH SERPL L TO P-CCNC: 239 — SIGNIFICANT CHANGE UP (ref 50–242)
LIPID PNL WITH DIRECT LDL SERPL: 96 MG/DL — SIGNIFICANT CHANGE UP (ref 4–129)
MAGNESIUM SERPL-MCNC: 1.9 MG/DL — SIGNIFICANT CHANGE UP (ref 1.8–2.4)
MCHC RBC-ENTMCNC: 19.2 PG — LOW (ref 27–31)
MCHC RBC-ENTMCNC: 19.6 PG — LOW (ref 27–31)
MCHC RBC-ENTMCNC: 26.5 G/DL — LOW (ref 32–37)
MCHC RBC-ENTMCNC: 27.2 G/DL — LOW (ref 32–37)
MCV RBC AUTO: 72.2 FL — LOW (ref 81–99)
MCV RBC AUTO: 72.5 FL — LOW (ref 81–99)
NRBC # BLD: 0 /100 WBCS — SIGNIFICANT CHANGE UP (ref 0–0)
NRBC # BLD: 0 /100 WBCS — SIGNIFICANT CHANGE UP (ref 0–0)
PHOSPHATE SERPL-MCNC: 3.5 MG/DL — SIGNIFICANT CHANGE UP (ref 2.1–4.9)
PLATELET # BLD AUTO: 213 K/UL — SIGNIFICANT CHANGE UP (ref 130–400)
PLATELET # BLD AUTO: 241 K/UL — SIGNIFICANT CHANGE UP (ref 130–400)
POTASSIUM SERPL-MCNC: 4.2 MMOL/L — SIGNIFICANT CHANGE UP (ref 3.5–5)
POTASSIUM SERPL-MCNC: 4.3 MMOL/L — SIGNIFICANT CHANGE UP (ref 3.5–5)
POTASSIUM SERPL-SCNC: 4.2 MMOL/L — SIGNIFICANT CHANGE UP (ref 3.5–5)
POTASSIUM SERPL-SCNC: 4.3 MMOL/L — SIGNIFICANT CHANGE UP (ref 3.5–5)
PROT SERPL-MCNC: 7 G/DL — SIGNIFICANT CHANGE UP (ref 6–8)
RBC # BLD: 4.49 M/UL — SIGNIFICANT CHANGE UP (ref 4.2–5.4)
RBC # BLD: 4.94 M/UL — SIGNIFICANT CHANGE UP (ref 4.2–5.4)
RBC # FLD: 18.2 % — HIGH (ref 11.5–14.5)
RBC # FLD: 18.3 % — HIGH (ref 11.5–14.5)
SODIUM SERPL-SCNC: 138 MMOL/L — SIGNIFICANT CHANGE UP (ref 135–146)
SODIUM SERPL-SCNC: 143 MMOL/L — SIGNIFICANT CHANGE UP (ref 135–146)
TIBC SERPL-MCNC: 351 UG/DL — SIGNIFICANT CHANGE UP (ref 220–430)
TOTAL CHOLESTEROL/HDL RATIO MEASUREMENT: 2.7 RATIO — LOW (ref 4–5.5)
TRIGL SERPL-MCNC: 57 MG/DL — SIGNIFICANT CHANGE UP (ref 10–149)
TROPONIN T SERPL-MCNC: 0.01 NG/ML — SIGNIFICANT CHANGE UP
UIBC SERPL-MCNC: 328 UG/DL — SIGNIFICANT CHANGE UP (ref 110–370)
WBC # BLD: 10.12 K/UL — SIGNIFICANT CHANGE UP (ref 4.8–10.8)
WBC # BLD: 8.11 K/UL — SIGNIFICANT CHANGE UP (ref 4.8–10.8)
WBC # FLD AUTO: 10.12 K/UL — SIGNIFICANT CHANGE UP (ref 4.8–10.8)
WBC # FLD AUTO: 8.11 K/UL — SIGNIFICANT CHANGE UP (ref 4.8–10.8)

## 2019-03-29 PROCEDURE — 99223 1ST HOSP IP/OBS HIGH 75: CPT

## 2019-03-29 RX ORDER — ATORVASTATIN CALCIUM 80 MG/1
80 TABLET, FILM COATED ORAL AT BEDTIME
Qty: 0 | Refills: 0 | Status: DISCONTINUED | OUTPATIENT
Start: 2019-03-29 | End: 2019-04-02

## 2019-03-29 RX ORDER — METOPROLOL TARTRATE 50 MG
50 TABLET ORAL
Qty: 0 | Refills: 0 | Status: DISCONTINUED | OUTPATIENT
Start: 2019-03-29 | End: 2019-04-02

## 2019-03-29 RX ADMIN — ATORVASTATIN CALCIUM 80 MILLIGRAM(S): 80 TABLET, FILM COATED ORAL at 23:03

## 2019-03-29 RX ADMIN — Medication 0.5 MILLIGRAM(S): at 01:31

## 2019-03-29 RX ADMIN — CEFTRIAXONE 100 GRAM(S): 500 INJECTION, POWDER, FOR SOLUTION INTRAMUSCULAR; INTRAVENOUS at 17:59

## 2019-03-29 RX ADMIN — Medication 50 MILLIGRAM(S): at 17:59

## 2019-03-29 NOTE — PROGRESS NOTE ADULT - ASSESSMENT
89 yo female with pmh of Afib, non compliant with Xarelto and Metoprolol presents with acute onset left arm >leg weakness . s/p IV tpa with significant improvement in symptoms with some worsening later current nihss 4. Patient noted to have paroxysmal AFIB Overnight. Rpt cth revealed an area of loss of gray white differentiation in the right frontal lobe. cta h/n negative for LVO.      pLAN  Admit to ICU  Repeat HCT this afternoon  No antiplatelets /anticoagulants  Start Lipitor 80mg q 24  Cardio eval for afib  n/c mri brain  echo   lipid profile, hbaic  Avoid hypotension   keep bp<185/110  q 1 neuro checks

## 2019-03-29 NOTE — SWALLOW BEDSIDE ASSESSMENT ADULT - NS SPL SWALLOW CLINIC TRIAL FT
+mild oral dysphagia for +mild oral dysphagia for puree w/o immediate s/s of penetration/aspiration.

## 2019-03-29 NOTE — CONSULT NOTE ADULT - SUBJECTIVE AND OBJECTIVE BOX
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 As per daughters, they were watching her in camera, last movement they saw was 11: 34, then they didnt see any movement, they found her on the floor in the kitchen, no head trauma, ws complaining of left sided weakness and facial droop. she denies any chest pain, palpitation, no visual symptoms, no dizziness.  she denies any fever, no GI or urinary complaints ( no change in frequency).  she lives alone, is very active, does everything for her daily activities.  while in ER, her NIHSS was 12, ct scan head was normal, she was within the window period was given TPA. NIHSS improved from 12--4, neurology on board.  she was admitted in 2018 because of UTI. (28 Mar 2019 20:01). called for ICU, NO drips, s/p repeat head ct, pan ct      PAST MEDICAL & SURGICAL HISTORY:  Esophageal stricture  Hip fracture  GERD (gastroesophageal reflux disease)  Diverticulosis  Hyperlipidemia  Hypertension  Stroke: no residual defecits  Atrial fibrillation  S/P ORIF (open reduction internal fixation) fracture: left hip  S/P cholecystectomy      SOCIAL HX:   Smoking  -    FAMILY HISTORY:  No pertinent family history in first degree relatives      REVIEW OF SYSTEMS hpi          Allergies    No Known Allergies    Intolerances        atorvastatin 40 milliGRAM(s) Oral at bedtime  cefTRIAXone   IVPB 1 Gram(s) IV Intermittent every 24 hours  metoprolol tartrate 50 milliGRAM(s) Oral two times a day  pantoprazole    Tablet 40 milliGRAM(s) Oral before breakfast  : Home Meds:      PHYSICAL EXAM    ICU Vital Signs Last 24 Hrs  T(C): 36.6 (28 Mar 2019 17:56), Max: 36.7 (28 Mar 2019 15:30)  T(F): 97.9 (28 Mar 2019 17:56), Max: 98.1 (28 Mar 2019 17:31)  HR: 92 (29 Mar 2019 04:00) (81 - 158)  BP: 142/72 (29 Mar 2019 04:00) (114/84 - 183/99)  RR: 18 (29 Mar 2019 01:48) (16 - 20)  SpO2: 95% (29 Mar 2019 01:48) (95% - 99%)      General:  HEENT:  mild facial droop          Lymph Nodes: No cervical LN   Lungs: Bilateral BS  Cardiovascular: Regular  Abdomen: Soft, Positive BS  Extremities: No clubbing  Skin: Warm  Neurological: Non focal      19 @ 07:01  -  19 @ 07:00  --------------------------------------------------------  IN:  Total IN: 0 mL    OUT:    Indwelling Catheter - Urethral: 160 mL  Total OUT: 160 mL    Total NET: -160 mL          LABS:                          9.5    10.12 )-----------( 241      ( 29 Mar 2019 00:32 )             35.8                                                   138  |  104  |  18  ----------------------------<  102<H>  4.3   |  21  |  0.7    Ca    8.6      29 Mar 2019 00:32    TPro  7.0  /  Alb  3.7  /  TBili  0.6  /  DBili  x   /  AST  48<H>  /  ALT  27  /  AlkPhos  117<H>                                               Urinalysis Basic - ( 28 Mar 2019 17:20 )    Color: Yellow / Appearance: Turbid / S.025 / pH: x  Gluc: x / Ketone: Negative  / Bili: Negative / Urobili: 1.0 mg/dL   Blood: x / Protein: 100 mg/dL / Nitrite: Negative   Leuk Esterase: Large / RBC: 26-50 /HPF / WBC >50 /HPF   Sq Epi: x / Non Sq Epi: x / Bacteria: x        CARDIAC MARKERS ( 29 Mar 2019 00:32 )  x     / 0.01 ng/mL / 50 U/L / x     / x                                                LIVER FUNCTIONS - ( 29 Mar 2019 00:32 )  Alb: 3.7 g/dL / Pro: 7.0 g/dL / ALK PHOS: 117 U/L / ALT: 27 U/L / AST: 48 U/L / GGT: x                                                                                                                                       X-Rays  / pan CT reviewed    MEDICATIONS  (STANDING):  atorvastatin 40 milliGRAM(s) Oral at bedtime  cefTRIAXone   IVPB 1 Gram(s) IV Intermittent every 24 hours  metoprolol tartrate 50 milliGRAM(s) Oral two times a day  pantoprazole    Tablet 40 milliGRAM(s) Oral before breakfast    MEDICATIONS  (PRN):

## 2019-03-29 NOTE — SWALLOW BEDSIDE ASSESSMENT ADULT - SWALLOW EVAL: FUNCTIONAL LEVEL AT TIME OF EVAL
Pt. presenting with increased confusion from her baseline per daughter. Pt. confused to place and situation.

## 2019-03-29 NOTE — SWALLOW BEDSIDE ASSESSMENT ADULT - SLP PERTINENT HISTORY OF CURRENT PROBLEM
Pt. admitted s/p fall l. sided weakness and facial droop. s/p TPA. PMHx: old CVA, h/o GERD, and diverticulosis

## 2019-03-29 NOTE — SWALLOW BEDSIDE ASSESSMENT ADULT - SLP GENERAL OBSERVATIONS
Pt. received in bed on O2 nasal cannula w/ family at bedside. +increased confusion to time, place, and situation noted. Pt. received in bed in ED w/ family at bedside. +increased confusion to time, place, and situation noted. Pt w/ difficulty maintaining arousal towards end of eval

## 2019-03-29 NOTE — SWALLOW BEDSIDE ASSESSMENT ADULT - COMMENTS
Pt. known to  services in 2017 with reccs for mech soft with thin liquids. +overt s/s of penetration/aspiration for nectar thick liquids. +mild oral dysphagia for honey thick liquids w/o immediate overt s/s of penetration/aspiration.

## 2019-03-29 NOTE — SWALLOW BEDSIDE ASSESSMENT ADULT - ASR SWALLOW DENTITION
upper dentures were taken out 2' to poor seal/upper dentures/lower dentures upper dentures/lower dentures/upper dentures were taken out 2' to poor seal (loose fitting)

## 2019-03-29 NOTE — SWALLOW BEDSIDE ASSESSMENT ADULT - PHARYNGEAL PHASE
pt. required max cues to initiate swallow/Delayed pharyngeal swallow/Multiple swallows +slightly wet vocal quality which cleared when pt. was instructed to cough and then swallow/Multiple swallows/Delayed pharyngeal swallow/Cough post oral intake +slightly wet vocal quality which cleared when pt. was instructed to cough and then swallow/Cough post oral intake/Multiple swallows/Wet vocal quality post oral intake/Delayed pharyngeal swallow +slightly wet vocal quality post PO intake/Multiple swallows/Delayed pharyngeal swallow

## 2019-03-29 NOTE — CONSULT NOTE ADULT - ASSESSMENT
IMPRESSION: L facial droop/ l side weakness/ s/p tpa, A fib not compliant with xarelto s/p pan CT      PLAN:    CNS: Neuro eval, repeat head CT, neuro check q 1h    HEENT:  Oral care    PULMONARY:  HOB @ 45 degrees, aspiration precaution    CARDIOVASCULAR: cardio eval, echo, lipitor    GI: GI prophylaxis                                          Feeding speech and swallow    RENAL:  F/u  lytes.  Correct as needed. accurate I/O    INFECTIOUS DISEASE: pancx, f/up urine infection    HEMATOLOGICAL:  DVT prophylaxis.    ENDOCRINE:  Follow up FS.  Insulin protocol if needed.    CODE STATUS: FULL CODE    DISPOSITION: Pt requires continued monitoring in the MICU  will follow

## 2019-03-29 NOTE — SWALLOW BEDSIDE ASSESSMENT ADULT - SWALLOW EVAL: DIAGNOSIS
+mild oral dysphagia for puree  honey thick liquids w/o immediate overt s/s of penetration/aspiration. +overt s/s of penetration/aspiration for nectar thick liquids. +mild oral dysphagia for puree and honey thick liquids w/o immediate overt s/s of penetration/aspiration. +overt s/s of penetration/aspiration for nectar thick liquids.

## 2019-03-29 NOTE — PROGRESS NOTE ADULT - ASSESSMENT
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    # left sided weakness     - ischemic stroke, initial NIHSS was 12    - s/p TPA     - Neurology evaluation     # positive urinalysis     - C/w Rocephin        # AFIB   -  HOLD ON XARELTO   - started on metoprolol for rate control   - ECHO   - Pt does not take any medications at home  -  Can restart anticoagulation in 24 hours s/p TPA    # DVT prophylaxis sequential compression    #Diet: Speech and swallow evaluation     # Fall precaution, bed rest for now

## 2019-03-29 NOTE — CHART NOTE - NSCHARTNOTEFT_GEN_A_CORE
Tertiary Survey    Patient seen and examined. No complaints at this time.     PHYSICAL EXAM:  Craniofacial: Atraumatic, No deformity  Eyes: Pupil Size equal B/L and RTL. EOMI  Oropharynx: Atraumatic  Neck: Non-tender, No deformity, Trachea midline.  Chest: Equal breath sounds, non-tender, No deformity or crepitus  Heart: RSR, No murmurs, no rubs  Abdomen: Atraumatic, Non-tender, non-distended. No hepatosplenomegaly  Pelvis: Stable, non tender, no deformity  Back: Spine nontender. atraumatic  Extremities: Atraumatic, no deformities, normal pulses  Psych: Mood and affect normal. Judgment and insight normal    All images/reports reviewed. No further traumatic work-up warranted.

## 2019-03-30 LAB
ANION GAP SERPL CALC-SCNC: 11 MMOL/L — SIGNIFICANT CHANGE UP (ref 7–14)
BUN SERPL-MCNC: 16 MG/DL — SIGNIFICANT CHANGE UP (ref 10–20)
CA-I BLD-SCNC: 1.12 MMOL/L — SIGNIFICANT CHANGE UP (ref 1.12–1.3)
CALCIUM SERPL-MCNC: 8.1 MG/DL — LOW (ref 8.5–10.1)
CHLORIDE SERPL-SCNC: 108 MMOL/L — SIGNIFICANT CHANGE UP (ref 98–110)
CO2 SERPL-SCNC: 22 MMOL/L — SIGNIFICANT CHANGE UP (ref 17–32)
CREAT SERPL-MCNC: 0.7 MG/DL — SIGNIFICANT CHANGE UP (ref 0.7–1.5)
GLUCOSE SERPL-MCNC: 71 MG/DL — SIGNIFICANT CHANGE UP (ref 70–99)
HCT VFR BLD CALC: 32.6 % — LOW (ref 37–47)
HGB BLD-MCNC: 8.6 G/DL — LOW (ref 12–16)
MCHC RBC-ENTMCNC: 19.1 PG — LOW (ref 27–31)
MCHC RBC-ENTMCNC: 26.4 G/DL — LOW (ref 32–37)
MCV RBC AUTO: 72.4 FL — LOW (ref 81–99)
NRBC # BLD: 0 /100 WBCS — SIGNIFICANT CHANGE UP (ref 0–0)
PHOSPHATE SERPL-MCNC: 3.5 MG/DL — SIGNIFICANT CHANGE UP (ref 2.1–4.9)
PLATELET # BLD AUTO: 221 K/UL — SIGNIFICANT CHANGE UP (ref 130–400)
POTASSIUM SERPL-MCNC: 3.8 MMOL/L — SIGNIFICANT CHANGE UP (ref 3.5–5)
POTASSIUM SERPL-SCNC: 3.8 MMOL/L — SIGNIFICANT CHANGE UP (ref 3.5–5)
RBC # BLD: 4.5 M/UL — SIGNIFICANT CHANGE UP (ref 4.2–5.4)
RBC # FLD: 18.1 % — HIGH (ref 11.5–14.5)
SODIUM SERPL-SCNC: 141 MMOL/L — SIGNIFICANT CHANGE UP (ref 135–146)
TSH SERPL-MCNC: 1.18 UIU/ML — SIGNIFICANT CHANGE UP (ref 0.27–4.2)
VIT B12 SERPL-MCNC: 390 PG/ML — SIGNIFICANT CHANGE UP (ref 232–1245)
WBC # BLD: 8.12 K/UL — SIGNIFICANT CHANGE UP (ref 4.8–10.8)
WBC # FLD AUTO: 8.12 K/UL — SIGNIFICANT CHANGE UP (ref 4.8–10.8)

## 2019-03-30 RX ORDER — DIPHENHYDRAMINE HCL 50 MG
50 CAPSULE ORAL ONCE
Qty: 0 | Refills: 0 | Status: COMPLETED | OUTPATIENT
Start: 2019-03-30 | End: 2019-03-30

## 2019-03-30 RX ADMIN — ATORVASTATIN CALCIUM 80 MILLIGRAM(S): 80 TABLET, FILM COATED ORAL at 21:58

## 2019-03-30 RX ADMIN — Medication 50 MILLIGRAM(S): at 21:58

## 2019-03-30 RX ADMIN — PANTOPRAZOLE SODIUM 40 MILLIGRAM(S): 20 TABLET, DELAYED RELEASE ORAL at 12:34

## 2019-03-30 RX ADMIN — Medication 50 MILLIGRAM(S): at 05:50

## 2019-03-30 RX ADMIN — CEFTRIAXONE 100 GRAM(S): 500 INJECTION, POWDER, FOR SOLUTION INTRAMUSCULAR; INTRAVENOUS at 18:41

## 2019-03-30 RX ADMIN — Medication 50 MILLIGRAM(S): at 18:41

## 2019-03-30 NOTE — SWALLOW BEDSIDE ASSESSMENT ADULT - ASR SWALLOW ASPIRATION MONITOR
cough/gurgly voice/position upright (89Y)
fever/throat clearing/upper respiratory infection/change of breathing pattern/cough/pneumonia/oral hygiene/position upright (89Y)/gurgly voice

## 2019-03-30 NOTE — SWALLOW BEDSIDE ASSESSMENT ADULT - SLP PERTINENT HISTORY OF CURRENT PROBLEM
Pt. admitted s/p fall l. sided weakness and facial droop. s/p TPA. PMHx: old CVA, h/o GERD, and diverticulosis. slp recommended puree honey yesterday. daughter at bedside reports improved status overall. and tolerating diet

## 2019-03-30 NOTE — SWALLOW BEDSIDE ASSESSMENT ADULT - SWALLOW EVAL: DIAGNOSIS
mild - mod oral impairment for puree, honey thick and nectar thick liquids. no overt symptoms of laryngeal penetration/aspiration

## 2019-03-30 NOTE — PROGRESS NOTE ADULT - ASSESSMENT
Impression:  89 yo female with pmh of Afib, non compliant with Xarelto and Metoprolol presents with acute onset left arm >leg weakness . s/p IV tpa with significant improvement in symptoms. MRI + for acute infarcts right insular cortex, frontal lobe and postcentral gyral cortex. Micro-hemorrhages on MRI as well.    Plan:  continue Q1H neuro checks  continue Lipitor  PT/OT eval  hold off on antiplatelet and anticoagulants, re-check CT head in 24 hours  2d echo w bubble  call code stroke for worsening in neuro status Impression:  89 yo female with pmh of Afib, non compliant with Xarelto and Metoprolol presents with acute onset left arm >leg weakness . s/p IV tpa with significant improvement in symptoms. MRI + for acute infarcts right insular cortex, frontal lobe and postcentral gyral cortex. Micro-hemorrhages on MRI as well.    Plan:  transfer to stroke unit  continue Lipitor  PT/OT eval  hold off on antiplatelet and anticoagulants, re-check CT head in 24 hours  2d echo w bubble  call code stroke for worsening in neuro status Impression:  89 yo female with pmh of Afib, non compliant with Xarelto and Metoprolol presents with acute onset left arm >leg weakness . s/p IV tpa with significant improvement in symptoms. MRI + for acute infarcts right insular cortex, frontal lobe and postcentral gyral cortex. Micro-hemorrhages on MRI as well.    Plan:  transfer to stroke unit  continue Lipitor  PT/OT eval  hold off on antiplatelet and anticoagulants, re-check CT head in 24 hours  If clinically stable can start anticoagulation in 2 days  2d echo w bubble  Good candidate for rehab.  Get OOB to chair  call code stroke for worsening in neuro status

## 2019-03-31 LAB
ALBUMIN SERPL ELPH-MCNC: 3 G/DL — LOW (ref 3.5–5.2)
ALP SERPL-CCNC: 85 U/L — SIGNIFICANT CHANGE UP (ref 30–115)
ALT FLD-CCNC: 11 U/L — SIGNIFICANT CHANGE UP (ref 0–41)
ANION GAP SERPL CALC-SCNC: 10 MMOL/L — SIGNIFICANT CHANGE UP (ref 7–14)
AST SERPL-CCNC: 14 U/L — SIGNIFICANT CHANGE UP (ref 0–41)
BASOPHILS # BLD AUTO: 0.06 K/UL — SIGNIFICANT CHANGE UP (ref 0–0.2)
BASOPHILS NFR BLD AUTO: 0.7 % — SIGNIFICANT CHANGE UP (ref 0–1)
BILIRUB SERPL-MCNC: 0.4 MG/DL — SIGNIFICANT CHANGE UP (ref 0.2–1.2)
BUN SERPL-MCNC: 18 MG/DL — SIGNIFICANT CHANGE UP (ref 10–20)
CALCIUM SERPL-MCNC: 7.9 MG/DL — LOW (ref 8.5–10.1)
CHLORIDE SERPL-SCNC: 106 MMOL/L — SIGNIFICANT CHANGE UP (ref 98–110)
CO2 SERPL-SCNC: 24 MMOL/L — SIGNIFICANT CHANGE UP (ref 17–32)
CREAT SERPL-MCNC: 0.6 MG/DL — LOW (ref 0.7–1.5)
EOSINOPHIL # BLD AUTO: 0.25 K/UL — SIGNIFICANT CHANGE UP (ref 0–0.7)
EOSINOPHIL NFR BLD AUTO: 2.8 % — SIGNIFICANT CHANGE UP (ref 0–8)
GLUCOSE SERPL-MCNC: 96 MG/DL — SIGNIFICANT CHANGE UP (ref 70–99)
HCT VFR BLD CALC: 30.2 % — LOW (ref 37–47)
HGB BLD-MCNC: 8.1 G/DL — LOW (ref 12–16)
IMM GRANULOCYTES NFR BLD AUTO: 0.6 % — HIGH (ref 0.1–0.3)
LYMPHOCYTES # BLD AUTO: 1.03 K/UL — LOW (ref 1.2–3.4)
LYMPHOCYTES # BLD AUTO: 11.7 % — LOW (ref 20.5–51.1)
MCHC RBC-ENTMCNC: 19.3 PG — LOW (ref 27–31)
MCHC RBC-ENTMCNC: 26.8 G/DL — LOW (ref 32–37)
MCV RBC AUTO: 72.1 FL — LOW (ref 81–99)
MONOCYTES # BLD AUTO: 1.03 K/UL — HIGH (ref 0.1–0.6)
MONOCYTES NFR BLD AUTO: 11.7 % — HIGH (ref 1.7–9.3)
NEUTROPHILS # BLD AUTO: 6.39 K/UL — SIGNIFICANT CHANGE UP (ref 1.4–6.5)
NEUTROPHILS NFR BLD AUTO: 72.5 % — SIGNIFICANT CHANGE UP (ref 42.2–75.2)
NRBC # BLD: 0 /100 WBCS — SIGNIFICANT CHANGE UP (ref 0–0)
PLATELET # BLD AUTO: 205 K/UL — SIGNIFICANT CHANGE UP (ref 130–400)
POTASSIUM SERPL-MCNC: 3.7 MMOL/L — SIGNIFICANT CHANGE UP (ref 3.5–5)
POTASSIUM SERPL-SCNC: 3.7 MMOL/L — SIGNIFICANT CHANGE UP (ref 3.5–5)
PROT SERPL-MCNC: 5.4 G/DL — LOW (ref 6–8)
RBC # BLD: 4.19 M/UL — LOW (ref 4.2–5.4)
RBC # FLD: 18.1 % — HIGH (ref 11.5–14.5)
SODIUM SERPL-SCNC: 140 MMOL/L — SIGNIFICANT CHANGE UP (ref 135–146)
WBC # BLD: 8.81 K/UL — SIGNIFICANT CHANGE UP (ref 4.8–10.8)
WBC # FLD AUTO: 8.81 K/UL — SIGNIFICANT CHANGE UP (ref 4.8–10.8)

## 2019-03-31 RX ORDER — CEFTRIAXONE 500 MG/1
1 INJECTION, POWDER, FOR SOLUTION INTRAMUSCULAR; INTRAVENOUS ONCE
Qty: 0 | Refills: 0 | Status: COMPLETED | OUTPATIENT
Start: 2019-03-31 | End: 2019-03-31

## 2019-03-31 RX ADMIN — CEFTRIAXONE 100 GRAM(S): 500 INJECTION, POWDER, FOR SOLUTION INTRAMUSCULAR; INTRAVENOUS at 20:09

## 2019-03-31 RX ADMIN — PANTOPRAZOLE SODIUM 40 MILLIGRAM(S): 20 TABLET, DELAYED RELEASE ORAL at 06:16

## 2019-03-31 RX ADMIN — Medication 50 MILLIGRAM(S): at 06:15

## 2019-03-31 RX ADMIN — Medication 50 MILLIGRAM(S): at 17:07

## 2019-03-31 RX ADMIN — Medication 0.5 MILLIGRAM(S): at 06:13

## 2019-03-31 RX ADMIN — CEFTRIAXONE 100 GRAM(S): 500 INJECTION, POWDER, FOR SOLUTION INTRAMUSCULAR; INTRAVENOUS at 17:07

## 2019-03-31 NOTE — PROGRESS NOTE ADULT - ASSESSMENT
89 yo female with pmh of Afib, non compliant with Xarelto and Metoprolol presents with acute onset left arm >leg weakness . s/p IV tpa with significant improvement in symptoms. MRI + for acute infarcts right insular cortex, frontal lobe and postcentral gyral cortex. Micro-hemorrhages on MRI as well.    Plan:  Continue stroke unit  continue Lipitor  PT/OT/rehab  hold off on antiplatelet and anticoagulants  F/u on repeat CTH read from today  F/u echo results  No Ativan for sedation, use Benadryl      Neuroattending note will follow 89 yo female with pmh of Afib, non compliant with Xarelto and Metoprolol presents with acute onset left arm >leg weakness . s/p IV tpa with significant improvement in symptoms. MRI + for acute infarcts right insular cortex, frontal lobe and postcentral gyral cortex. Micro-hemorrhages on MRI as well.    Plan:  Can be downgraded from stroke unit overnight or in AM  continue Lipitor  PT/OT/rehab  hold off on antiplatelet and anticoagulants  Can start anticoagulation tomorrow if clinically stable  F/u on repeat CTH read from today  F/u echo results  No Ativan for sedation, use Benadryl

## 2019-03-31 NOTE — PROGRESS NOTE ADULT - ATTENDING COMMENTS
Patient seen and examined this morning.  Patient is awake and alert oriented to name.  Moving all extremities with possibly slight drift on left. sLight facial on left  Intermittently following commands  NIHSS 3  mrankin 2-3    Plan as above
Patient seen and examined with PA.  Patient slightly sedate but otherwise NIHSS 0    Plan as above
Patient seen and examined with PA.  Patient examined with daughter at bedside  Patient significantly improved and answering questions and following all commands.  No obvious drift    Plan as above

## 2019-04-01 LAB
ALBUMIN SERPL ELPH-MCNC: 2.9 G/DL — LOW (ref 3.5–5.2)
ALP SERPL-CCNC: 83 U/L — SIGNIFICANT CHANGE UP (ref 30–115)
ALT FLD-CCNC: 9 U/L — SIGNIFICANT CHANGE UP (ref 0–41)
ANION GAP SERPL CALC-SCNC: 10 MMOL/L — SIGNIFICANT CHANGE UP (ref 7–14)
AST SERPL-CCNC: 13 U/L — SIGNIFICANT CHANGE UP (ref 0–41)
BASOPHILS # BLD AUTO: 0.08 K/UL — SIGNIFICANT CHANGE UP (ref 0–0.2)
BASOPHILS NFR BLD AUTO: 1 % — SIGNIFICANT CHANGE UP (ref 0–1)
BILIRUB SERPL-MCNC: 0.3 MG/DL — SIGNIFICANT CHANGE UP (ref 0.2–1.2)
BUN SERPL-MCNC: 13 MG/DL — SIGNIFICANT CHANGE UP (ref 10–20)
CALCIUM SERPL-MCNC: 7.8 MG/DL — LOW (ref 8.5–10.1)
CHLORIDE SERPL-SCNC: 105 MMOL/L — SIGNIFICANT CHANGE UP (ref 98–110)
CO2 SERPL-SCNC: 24 MMOL/L — SIGNIFICANT CHANGE UP (ref 17–32)
CREAT SERPL-MCNC: 0.5 MG/DL — LOW (ref 0.7–1.5)
EOSINOPHIL # BLD AUTO: 0.4 K/UL — SIGNIFICANT CHANGE UP (ref 0–0.7)
EOSINOPHIL NFR BLD AUTO: 4.9 % — SIGNIFICANT CHANGE UP (ref 0–8)
GLUCOSE SERPL-MCNC: 87 MG/DL — SIGNIFICANT CHANGE UP (ref 70–99)
HCT VFR BLD CALC: 30.3 % — LOW (ref 37–47)
HGB BLD-MCNC: 8.1 G/DL — LOW (ref 12–16)
IMM GRANULOCYTES NFR BLD AUTO: 0.5 % — HIGH (ref 0.1–0.3)
LYMPHOCYTES # BLD AUTO: 1.21 K/UL — SIGNIFICANT CHANGE UP (ref 1.2–3.4)
LYMPHOCYTES # BLD AUTO: 15 % — LOW (ref 20.5–51.1)
MCHC RBC-ENTMCNC: 19.4 PG — LOW (ref 27–31)
MCHC RBC-ENTMCNC: 26.7 G/DL — LOW (ref 32–37)
MCV RBC AUTO: 72.7 FL — LOW (ref 81–99)
MONOCYTES # BLD AUTO: 1.02 K/UL — HIGH (ref 0.1–0.6)
MONOCYTES NFR BLD AUTO: 12.6 % — HIGH (ref 1.7–9.3)
NEUTROPHILS # BLD AUTO: 5.34 K/UL — SIGNIFICANT CHANGE UP (ref 1.4–6.5)
NEUTROPHILS NFR BLD AUTO: 66 % — SIGNIFICANT CHANGE UP (ref 42.2–75.2)
NRBC # BLD: 0 /100 WBCS — SIGNIFICANT CHANGE UP (ref 0–0)
PLATELET # BLD AUTO: 219 K/UL — SIGNIFICANT CHANGE UP (ref 130–400)
POTASSIUM SERPL-MCNC: 4.3 MMOL/L — SIGNIFICANT CHANGE UP (ref 3.5–5)
POTASSIUM SERPL-SCNC: 4.3 MMOL/L — SIGNIFICANT CHANGE UP (ref 3.5–5)
PROT SERPL-MCNC: 5.3 G/DL — LOW (ref 6–8)
RBC # BLD: 4.17 M/UL — LOW (ref 4.2–5.4)
RBC # FLD: 18.2 % — HIGH (ref 11.5–14.5)
SODIUM SERPL-SCNC: 139 MMOL/L — SIGNIFICANT CHANGE UP (ref 135–146)
WBC # BLD: 8.09 K/UL — SIGNIFICANT CHANGE UP (ref 4.8–10.8)
WBC # FLD AUTO: 8.09 K/UL — SIGNIFICANT CHANGE UP (ref 4.8–10.8)

## 2019-04-01 RX ORDER — IRON SUCROSE 20 MG/ML
100 INJECTION, SOLUTION INTRAVENOUS EVERY 24 HOURS
Qty: 0 | Refills: 0 | Status: DISCONTINUED | OUTPATIENT
Start: 2019-04-01 | End: 2019-04-01

## 2019-04-01 RX ORDER — DOCUSATE SODIUM 100 MG
100 CAPSULE ORAL DAILY
Qty: 0 | Refills: 0 | Status: DISCONTINUED | OUTPATIENT
Start: 2019-04-01 | End: 2019-04-02

## 2019-04-01 RX ORDER — SENNA PLUS 8.6 MG/1
2 TABLET ORAL AT BEDTIME
Qty: 0 | Refills: 0 | Status: DISCONTINUED | OUTPATIENT
Start: 2019-04-01 | End: 2019-04-02

## 2019-04-01 RX ORDER — FERROUS SULFATE 325(65) MG
325 TABLET ORAL DAILY
Qty: 0 | Refills: 0 | Status: DISCONTINUED | OUTPATIENT
Start: 2019-04-02 | End: 2019-04-02

## 2019-04-01 RX ORDER — RIVAROXABAN 15 MG-20MG
20 KIT ORAL EVERY 24 HOURS
Qty: 0 | Refills: 0 | Status: DISCONTINUED | OUTPATIENT
Start: 2019-04-01 | End: 2019-04-02

## 2019-04-01 RX ORDER — IRON SUCROSE 20 MG/ML
100 INJECTION, SOLUTION INTRAVENOUS EVERY 24 HOURS
Qty: 0 | Refills: 0 | Status: DISCONTINUED | OUTPATIENT
Start: 2019-04-01 | End: 2019-04-02

## 2019-04-01 RX ADMIN — Medication 50 MILLIGRAM(S): at 05:59

## 2019-04-01 RX ADMIN — IRON SUCROSE 210 MILLIGRAM(S): 20 INJECTION, SOLUTION INTRAVENOUS at 13:34

## 2019-04-01 RX ADMIN — Medication 50 MILLIGRAM(S): at 19:26

## 2019-04-01 RX ADMIN — SENNA PLUS 2 TABLET(S): 8.6 TABLET ORAL at 21:17

## 2019-04-01 RX ADMIN — RIVAROXABAN 20 MILLIGRAM(S): KIT at 19:27

## 2019-04-01 RX ADMIN — PANTOPRAZOLE SODIUM 40 MILLIGRAM(S): 20 TABLET, DELAYED RELEASE ORAL at 05:59

## 2019-04-01 RX ADMIN — ATORVASTATIN CALCIUM 80 MILLIGRAM(S): 80 TABLET, FILM COATED ORAL at 21:17

## 2019-04-01 RX ADMIN — CEFTRIAXONE 100 GRAM(S): 500 INJECTION, POWDER, FOR SOLUTION INTRAMUSCULAR; INTRAVENOUS at 18:05

## 2019-04-01 NOTE — SWALLOW BEDSIDE ASSESSMENT ADULT - SLP PERTINENT HISTORY OF CURRENT PROBLEM
Acute infarcts right insular cortex, frontal lobe and postcentral gyral cortex s/p TPA Acute infarcts right insular cortex, frontal lobe and postcentral gyral cortex, micro hemorrhage noted on MRI. s/p TPA

## 2019-04-01 NOTE — SWALLOW BEDSIDE ASSESSMENT ADULT - SWALLOW EVAL: RECOMMENDED FEEDING/EATING TECHNIQUES
small sips/bites/position upright (90 degrees)
small sips/bites
oral hygiene/allow for swallow between intakes/position upright (90 degrees)

## 2019-04-01 NOTE — PROGRESS NOTE ADULT - ASSESSMENT
# Ischemic Stroke  - s/p TPA/ symptoms improving  - MRI + for acute infarcts right insular cortex, frontal lobe and postcentral gyral cortex. Micro-hemorrhages on MRI as well.  -CTA H/N neg for vessel dissel  - F/u CT head performed pending official read  - PFO on TTE  - Neurolgy following  -PT/rehab  - if agitation Avoid Ativan - if agitated give benadryl as per neuro   -likely etiology is AFib      # AFIB  - OFF AC for now pending repeat CT results  - F/u with neurology if AC can be restarted  - on metoprolol for rate control    # positive urinalysis  - C/w Rocephin   - Blood Cx negative  - F/u urine Cx       # DVT prophylaxis sequential compression    #Diet: Speech and swallow evaluated: Dysphagia 1 nectar thick    # Fall precaution, OOB to chair # Ischemic Stroke  - s/p TPA/ symptoms improving  - MRI + for acute infarcts right insular cortex, frontal lobe and postcentral gyral cortex. Micro-hemorrhages on MRI as well.  -CTA H/N neg for vessel dissel  - F/u CT head performed pending official read  - PFO on TTE  - Neurolgy following  -PT/rehab  - if agitation Avoid Ativan - if agitated give benadryl as per neuro   -likely etiology of stroke is AFib  -LDL 97- HbA1c 6.2    # AFIB  - OFF AC for now pending repeat CT results  - F/u with neurology if AC can be restarted  - on metoprolol for rate control    #anemia- microcytic  low ferritin and iron   started on iron   monitor hg   # positive urinalysis  - C/w Rocephin   - Blood Cx negative  - F/u urine Cx       # DVT prophylaxis sequential compression    #Diet: Speech and swallow evaluated: Dysphagia 1 nectar thick    # Fall precaution, OOB to chair # Ischemic Stroke  - s/p TPA/ symptoms improving  - MRI + for acute infarcts right insular cortex, frontal lobe and postcentral gyral cortex. Micro-hemorrhages on MRI as well.  -CTA H/N neg for vessel dissel  - F/u CT head performed pending official read  - PFO on TTE  - Neurolgy following  -PT/rehab  - if agitation do not give Ativan / avoid benadryl - call daughter (she works here - she will come and stay at bedside)  -likely etiology of stroke is AFib  -LDL 97- HbA1c 6.2    # AFIB  - OFF AC for now pending repeat CT results  - F/u with neurology if AC can be restarted  - on metoprolol for rate control    #anemia- microcytic  low ferritin and iron   started on iron   monitor hg   # positive urinalysis  - C/w Rocephin   - Blood Cx negative  - F/u urine Cx       # DVT prophylaxis sequential compression    #Diet: Speech and swallow evaluated: Dysphagia 1 nectar thick    # Fall precaution, OOB to chair    DO NOT GIVE ATIVAN OR BENADRYL OR ANTHG IF AGITATED_ CALL DAUGHTER ANDREW (#in system) she will come and sit with mother as per her wishes

## 2019-04-01 NOTE — CONSULT NOTE ADULT - SUBJECTIVE AND OBJECTIVE BOX
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  As per daughters, they were watching her in camera, last movement they saw was 11: 34, then they didnt see any movement, they found her on the floor in the kitchen, no head trauma, ws complaining of left sided weakness and facial droop. she denies any chest pain, palpitation, no visual symptoms, no dizziness.  she denies any fever, no GI or urinary complaints ( no change in frequency).  she lives alone, is very active, does everything for her daily activities.  while in ER, her NIHSS was 12, ct scan head was normal, she was within the window period was given TPA. NIHSS improved from 12--4, neurology on board.  she was admitted in october 2018 because of UTI. (28 Mar 2019 20:01). MRI of brain showed right acute infarct      PAST MEDICAL & SURGICAL HISTORY:  Esophageal stricture  Hip fracture  GERD (gastroesophageal reflux disease)  Diverticulosis  Hyperlipidemia  Hypertension  Stroke: no residual defecits  Atrial fibrillation  S/P ORIF (open reduction internal fixation) fracture: left hip  S/P cholecystectomy      Hospital Course:    TODAY'S SUBJECTIVE & REVIEW OF SYMPTOMS:     Constitutional WNL   Cardio WNL   Resp WNL   GI WNL  Heme WNL  Endo WNL  Skin WNL  MSK WNL  Neuro left side weakness / dysphagia      MEDICATIONS  (STANDING):  atorvastatin 80 milliGRAM(s) Oral at bedtime  cefTRIAXone   IVPB 1 Gram(s) IV Intermittent every 24 hours  docusate sodium 100 milliGRAM(s) Oral daily  iron sucrose IVPB 100 milliGRAM(s) IV Intermittent every 24 hours  metoprolol tartrate 50 milliGRAM(s) Oral two times a day  pantoprazole    Tablet 40 milliGRAM(s) Oral before breakfast  senna 2 Tablet(s) Oral at bedtime    MEDICATIONS  (PRN):      FAMILY HISTORY:  No pertinent family history in first degree relatives      Allergies    No Known Allergies    Intolerances        SOCIAL HISTORY:    [  ] Etoh  [  ] Smoking  [  ] Substance abuse     Home Environment:  [ x ] Home Alone  [  ] Lives with Family  [  ] Home Health Aid    Dwelling:  [  ] Apartment  [x  ] Private House  [  ] Adult Home  [  ] Skilled Nursing Facility      [  ] Short Term  [  ] Long Term  [x  ] Stairs       Elevator [  ]    FUNCTIONAL STATUS PTA: (Check all that apply)  Ambulation: [ x  ]Independent    [  ] Dependent     [  ] Non-Ambulatory  Assistive Device: [  ] SA Cane  [  ]  Q Cane  [x  ] Walker  [  ]  Wheelchair  ADL : [x  ] Independent  [  ]  Dependent       Vital Signs Last 24 Hrs  T(C): 35.6 (01 Apr 2019 05:28), Max: 36.2 (31 Mar 2019 21:15)  T(F): 96 (01 Apr 2019 05:28), Max: 97.2 (31 Mar 2019 21:15)  HR: 87 (01 Apr 2019 05:28) (77 - 99)  BP: 123/64 (01 Apr 2019 05:28) (119/58 - 137/84)  BP(mean): --  RR: 18 (01 Apr 2019 05:28) (16 - 20)  SpO2: --      PHYSICAL EXAM: Alert & awake  GENERAL: NAD, well-groomed, well-developed  HEAD:  Atraumatic, Normocephalic  CHEST/LUNG: Clear   HEART: S1S2+  ABDOMEN: Soft, Nontender  EXTREMITIES:  no calf tenderness    NERVOUS SYSTEM:  Cranial Nerves 2-12 intact [  ] Abnormal  [  ]  ROM: WFL all extremities [ x ]  Abnormal [  ]  Motor Strength: WFL all extremities  [  ]  Abnormal [ x ]4+/5 left side  Sensation: intact to light touch [x  ] Abnormal [  ]  Reflexes: Symmetric [  ]  Abnormal [  ]    FUNCTIONAL STATUS:  Bed Mobility: Independent [  ]  Supervision [  ]  Needs Assistance [x  ]  N/A [  ]  Transfers: Independent [  ]  Supervision [  ]  Needs Assistance [ x ]  N/A [  ]   Ambulation: Independent [  ]  Supervision [  ]  Needs Assistance [  ]  N/A [  ]  ADL: Independent [  ] Requires Assistance [  ] N/A [  ]      LABS:                        8.1    8.09  )-----------( 219      ( 01 Apr 2019 05:00 )             30.3     04-01    139  |  105  |  13  ----------------------------<  87  4.3   |  24  |  0.5<L>    Ca    7.8<L>      01 Apr 2019 05:00    TPro  5.3<L>  /  Alb  2.9<L>  /  TBili  0.3  /  DBili  x   /  AST  13  /  ALT  9   /  AlkPhos  83  04-01          RADIOLOGY & ADDITIONAL STUDIES:    Assesment:

## 2019-04-01 NOTE — SWALLOW BEDSIDE ASSESSMENT ADULT - SWALLOW EVAL: RECOMMENDED DIET
dysphagia 1 puree consistency with nectar thick liquids
Dysphagia 1 puree and nectar thick liquids
Puree with honey thick liquids when patient is awake and alert

## 2019-04-01 NOTE — SWALLOW BEDSIDE ASSESSMENT ADULT - SLP GENERAL OBSERVATIONS
Pt awake, +confusion noted. pt in no apparent pain. 1;1 sit in place. granddaughter at bedside reports pt with dysphagia prior to admit, previously on soft and thin liquids

## 2019-04-01 NOTE — OCCUPATIONAL THERAPY INITIAL EVALUATION ADULT - PLANNED THERAPY INTERVENTIONS, OT EVAL
strengthening/transfer training/ADL retraining/balance training/bed mobility training/ROM/cognitive, visual perceptual/fine motor coordination training/neuromuscular re-education

## 2019-04-01 NOTE — SWALLOW BEDSIDE ASSESSMENT ADULT - ORAL PHASE
Decreased anterior-posterior movement of the bolus/Delayed oral transit time
Delayed oral transit time
Delayed oral transit time/Decreased anterior-posterior movement of the bolus

## 2019-04-01 NOTE — SWALLOW BEDSIDE ASSESSMENT ADULT - COMMENTS
+ toleration. DIet upgrade not attempted at this time 2' pt with generalized weakness and +confusion

## 2019-04-01 NOTE — CONSULT NOTE ADULT - ASSESSMENT
IMPRESSION: Rehab of R CVA / Left hemiparesis / dysphagia    PRECAUTIONS: [  ] Cardiac  [  ] Respiratory  [  ] Seizures [  ] Contact Isolation  [  ] Droplet Isolation  [  ] Other    Weight Bearing Status:     RECOMMENDATION:    Out of Bed to Chair     DVT/Decubiti Prophylaxis    REHAB PLAN:     [ x  ] Bedside P/T 3-5 times a week   [ x  ]   Bedside O/T  2-3 times a week             [   ] No Rehab Therapy Indicated                   [ x  ]  Speech Therapy   Conditioning/ROM                                    ADL  Bed Mobility                                               Conditioning/ROM  Transfers                                                     Bed Mobility  Sitting /Standing Balance                         Transfers                                        Gait Training                                               Sitting/Standing Balance  Stair Training [   ]Applicable                    Home equipment Eval                                                                        Splinting  [   ] Only      GOALS:   ADL   [ x  ]   Independent                    Transfers  [ x  ] Independent                          Ambulation  [  x ] Independent     [   x ] With device                            [   ]  CG                                                         [   ]  CG                                                                  [   ] CG                            [    ] Min A                                                   [   ] Min A                                                              [   ] Min  A          DISCHARGE PLAN:   [ x  ]  Good candidate for Intensive Rehabilitation/Hospital based                                             Will tolerate 3hrs Intensive Rehab Daily                                       [    ]  Short Term Rehab in Skilled Nursing Facility                                       [    ]  Home with Outpatient or  services                                         [    ]  Possible Candidate for Intensive Hospital based Rehab

## 2019-04-01 NOTE — PHYSICAL THERAPY INITIAL EVALUATION ADULT - GENERAL OBSERVATIONS, REHAB EVAL
[id number] pm. patient received/left in b/s chair, nad, + chair alarm, PCA present. patient is very [place] (LT), and has difficulty attending to a verbal task while performing a physical task, like ambulating.

## 2019-04-01 NOTE — CONSULT NOTE ADULT - REASON FOR ADMISSION
left sided weakness
s/p fall followed by left sided weakness and facial droop around 11: 30 am
s/p fall followed by left sided weakness and facial droop around 11: 30 am

## 2019-04-01 NOTE — OCCUPATIONAL THERAPY INITIAL EVALUATION ADULT - ADDITIONAL COMMENTS
per pt report, she utilized a RW at home, she reports she was independent however is a questionable historian, chart review also states she was independent, family utilized cameras for supervision

## 2019-04-01 NOTE — OCCUPATIONAL THERAPY INITIAL EVALUATION ADULT - GENERAL OBSERVATIONS, REHAB EVAL
pt received seated in b/s chair in NAD with PT Petra present, pt left seated in b/s chair, +IV lock, +chair alarm on

## 2019-04-02 ENCOUNTER — INPATIENT (INPATIENT)
Facility: HOSPITAL | Age: 84
LOS: 16 days | Discharge: HOME HEALTH | End: 2019-04-19
Attending: PHYSICAL MEDICINE & REHABILITATION | Admitting: PHYSICAL MEDICINE & REHABILITATION
Payer: COMMERCIAL

## 2019-04-02 VITALS
TEMPERATURE: 97 F | SYSTOLIC BLOOD PRESSURE: 101 MMHG | HEART RATE: 81 BPM | DIASTOLIC BLOOD PRESSURE: 52 MMHG | RESPIRATION RATE: 18 BRPM

## 2019-04-02 DIAGNOSIS — D64.9 ANEMIA, UNSPECIFIED: ICD-10-CM

## 2019-04-02 DIAGNOSIS — Z96.7 PRESENCE OF OTHER BONE AND TENDON IMPLANTS: Chronic | ICD-10-CM

## 2019-04-02 DIAGNOSIS — Z90.49 ACQUIRED ABSENCE OF OTHER SPECIFIED PARTS OF DIGESTIVE TRACT: Chronic | ICD-10-CM

## 2019-04-02 LAB
ANION GAP SERPL CALC-SCNC: 12 MMOL/L — SIGNIFICANT CHANGE UP (ref 7–14)
BUN SERPL-MCNC: 12 MG/DL — SIGNIFICANT CHANGE UP (ref 10–20)
CALCIUM SERPL-MCNC: 8.3 MG/DL — LOW (ref 8.5–10.1)
CHLORIDE SERPL-SCNC: 104 MMOL/L — SIGNIFICANT CHANGE UP (ref 98–110)
CO2 SERPL-SCNC: 25 MMOL/L — SIGNIFICANT CHANGE UP (ref 17–32)
CREAT SERPL-MCNC: 0.6 MG/DL — LOW (ref 0.7–1.5)
GLUCOSE SERPL-MCNC: 99 MG/DL — SIGNIFICANT CHANGE UP (ref 70–99)
HCT VFR BLD CALC: 32.5 % — LOW (ref 37–47)
HGB BLD-MCNC: 8.6 G/DL — LOW (ref 12–16)
MAGNESIUM SERPL-MCNC: 1.9 MG/DL — SIGNIFICANT CHANGE UP (ref 1.8–2.4)
MCHC RBC-ENTMCNC: 19.5 PG — LOW (ref 27–31)
MCHC RBC-ENTMCNC: 26.5 G/DL — LOW (ref 32–37)
MCV RBC AUTO: 73.9 FL — LOW (ref 81–99)
NRBC # BLD: 0 /100 WBCS — SIGNIFICANT CHANGE UP (ref 0–0)
PLATELET # BLD AUTO: 210 K/UL — SIGNIFICANT CHANGE UP (ref 130–400)
POTASSIUM SERPL-MCNC: 4.2 MMOL/L — SIGNIFICANT CHANGE UP (ref 3.5–5)
POTASSIUM SERPL-SCNC: 4.2 MMOL/L — SIGNIFICANT CHANGE UP (ref 3.5–5)
RBC # BLD: 4.4 M/UL — SIGNIFICANT CHANGE UP (ref 4.2–5.4)
RBC # FLD: 18 % — HIGH (ref 11.5–14.5)
SODIUM SERPL-SCNC: 141 MMOL/L — SIGNIFICANT CHANGE UP (ref 135–146)
WBC # BLD: 7.48 K/UL — SIGNIFICANT CHANGE UP (ref 4.8–10.8)
WBC # FLD AUTO: 7.48 K/UL — SIGNIFICANT CHANGE UP (ref 4.8–10.8)

## 2019-04-02 PROCEDURE — 93010 ELECTROCARDIOGRAM REPORT: CPT

## 2019-04-02 RX ORDER — ATORVASTATIN CALCIUM 80 MG/1
80 TABLET, FILM COATED ORAL AT BEDTIME
Qty: 0 | Refills: 0 | Status: DISCONTINUED | OUTPATIENT
Start: 2019-04-02 | End: 2019-04-19

## 2019-04-02 RX ORDER — DIPHENHYDRAMINE HCL 50 MG
25 CAPSULE ORAL ONCE
Qty: 0 | Refills: 0 | Status: COMPLETED | OUTPATIENT
Start: 2019-04-02 | End: 2019-04-02

## 2019-04-02 RX ORDER — CEFPODOXIME PROXETIL 100 MG
1 TABLET ORAL
Qty: 2 | Refills: 0 | OUTPATIENT
Start: 2019-04-02 | End: 2019-04-02

## 2019-04-02 RX ORDER — IRON SUCROSE 20 MG/ML
100 INJECTION, SOLUTION INTRAVENOUS EVERY 24 HOURS
Qty: 0 | Refills: 0 | Status: COMPLETED | OUTPATIENT
Start: 2019-04-02 | End: 2019-04-04

## 2019-04-02 RX ORDER — SENNA PLUS 8.6 MG/1
2 TABLET ORAL AT BEDTIME
Qty: 0 | Refills: 0 | Status: DISCONTINUED | OUTPATIENT
Start: 2019-04-02 | End: 2019-04-03

## 2019-04-02 RX ORDER — RIVAROXABAN 15 MG-20MG
20 KIT ORAL EVERY 24 HOURS
Qty: 0 | Refills: 0 | Status: DISCONTINUED | OUTPATIENT
Start: 2019-04-02 | End: 2019-04-19

## 2019-04-02 RX ORDER — DOCUSATE SODIUM 100 MG
100 CAPSULE ORAL DAILY
Qty: 0 | Refills: 0 | Status: DISCONTINUED | OUTPATIENT
Start: 2019-04-02 | End: 2019-04-03

## 2019-04-02 RX ORDER — PANTOPRAZOLE SODIUM 20 MG/1
40 TABLET, DELAYED RELEASE ORAL
Qty: 0 | Refills: 0 | Status: DISCONTINUED | OUTPATIENT
Start: 2019-04-02 | End: 2019-04-19

## 2019-04-02 RX ORDER — MAGNESIUM HYDROXIDE 400 MG/1
30 TABLET, CHEWABLE ORAL DAILY
Qty: 0 | Refills: 0 | Status: DISCONTINUED | OUTPATIENT
Start: 2019-04-02 | End: 2019-04-19

## 2019-04-02 RX ORDER — METOPROLOL TARTRATE 50 MG
50 TABLET ORAL
Qty: 0 | Refills: 0 | Status: DISCONTINUED | OUTPATIENT
Start: 2019-04-02 | End: 2019-04-19

## 2019-04-02 RX ORDER — FERROUS SULFATE 325(65) MG
1 TABLET ORAL
Qty: 30 | Refills: 0 | OUTPATIENT
Start: 2019-04-02 | End: 2019-05-01

## 2019-04-02 RX ORDER — ACETAMINOPHEN 500 MG
650 TABLET ORAL EVERY 6 HOURS
Qty: 0 | Refills: 0 | Status: DISCONTINUED | OUTPATIENT
Start: 2019-04-02 | End: 2019-04-19

## 2019-04-02 RX ORDER — ATORVASTATIN CALCIUM 80 MG/1
1 TABLET, FILM COATED ORAL
Qty: 0 | Refills: 0 | COMMUNITY
Start: 2019-04-02

## 2019-04-02 RX ORDER — ACETAMINOPHEN 500 MG
650 TABLET ORAL ONCE
Qty: 0 | Refills: 0 | Status: COMPLETED | OUTPATIENT
Start: 2019-04-02 | End: 2019-04-02

## 2019-04-02 RX ORDER — METOPROLOL TARTRATE 50 MG
1 TABLET ORAL
Qty: 0 | Refills: 0 | COMMUNITY
Start: 2019-04-02

## 2019-04-02 RX ORDER — LANOLIN ALCOHOL/MO/W.PET/CERES
3 CREAM (GRAM) TOPICAL AT BEDTIME
Qty: 0 | Refills: 0 | Status: DISCONTINUED | OUTPATIENT
Start: 2019-04-02 | End: 2019-04-05

## 2019-04-02 RX ORDER — FERROUS SULFATE 325(65) MG
325 TABLET ORAL DAILY
Qty: 0 | Refills: 0 | Status: DISCONTINUED | OUTPATIENT
Start: 2019-04-02 | End: 2019-04-19

## 2019-04-02 RX ORDER — CEFTRIAXONE 500 MG/1
1 INJECTION, POWDER, FOR SOLUTION INTRAMUSCULAR; INTRAVENOUS EVERY 24 HOURS
Qty: 0 | Refills: 0 | Status: COMPLETED | OUTPATIENT
Start: 2019-04-02 | End: 2019-04-04

## 2019-04-02 RX ORDER — DOCUSATE SODIUM 100 MG
1 CAPSULE ORAL
Qty: 0 | Refills: 0 | COMMUNITY
Start: 2019-04-02

## 2019-04-02 RX ORDER — RIVAROXABAN 15 MG-20MG
1 KIT ORAL
Qty: 30 | Refills: 0 | OUTPATIENT
Start: 2019-04-02 | End: 2019-05-01

## 2019-04-02 RX ORDER — SENNA PLUS 8.6 MG/1
2 TABLET ORAL
Qty: 0 | Refills: 0 | COMMUNITY
Start: 2019-04-02

## 2019-04-02 RX ORDER — PANTOPRAZOLE SODIUM 20 MG/1
1 TABLET, DELAYED RELEASE ORAL
Qty: 0 | Refills: 0 | COMMUNITY
Start: 2019-04-02

## 2019-04-02 RX ADMIN — SENNA PLUS 2 TABLET(S): 8.6 TABLET ORAL at 21:56

## 2019-04-02 RX ADMIN — Medication 50 MILLIGRAM(S): at 06:00

## 2019-04-02 RX ADMIN — Medication 50 MILLIGRAM(S): at 17:10

## 2019-04-02 RX ADMIN — CEFTRIAXONE 100 GRAM(S): 500 INJECTION, POWDER, FOR SOLUTION INTRAMUSCULAR; INTRAVENOUS at 17:09

## 2019-04-02 RX ADMIN — ATORVASTATIN CALCIUM 80 MILLIGRAM(S): 80 TABLET, FILM COATED ORAL at 21:56

## 2019-04-02 RX ADMIN — IRON SUCROSE 210 MILLIGRAM(S): 20 INJECTION, SOLUTION INTRAVENOUS at 21:56

## 2019-04-02 RX ADMIN — Medication 325 MILLIGRAM(S): at 12:14

## 2019-04-02 RX ADMIN — Medication 3 MILLIGRAM(S): at 23:05

## 2019-04-02 RX ADMIN — Medication 100 MILLIGRAM(S): at 12:06

## 2019-04-02 RX ADMIN — PANTOPRAZOLE SODIUM 40 MILLIGRAM(S): 20 TABLET, DELAYED RELEASE ORAL at 06:00

## 2019-04-02 RX ADMIN — Medication 25 MILLIGRAM(S): at 01:23

## 2019-04-02 RX ADMIN — IRON SUCROSE 210 MILLIGRAM(S): 20 INJECTION, SOLUTION INTRAVENOUS at 13:51

## 2019-04-02 RX ADMIN — Medication 650 MILLIGRAM(S): at 12:16

## 2019-04-02 RX ADMIN — RIVAROXABAN 20 MILLIGRAM(S): KIT at 17:11

## 2019-04-02 NOTE — DISCHARGE NOTE PROVIDER - HOSPITAL COURSE
Ischemic Stroke with MRI showing Small acute infarcts right insular cortex, frontal lobe and     postcentral gyral cortex as described above    - s/p TPA/ symptoms improving NIH 0 now      Micro-hemorrhages on MRI as well.    -CTA H/N neg for vessel dissel    - F/u CT head neg for bleed started by neurology on AC xarelto     - PFO on TTE    -likely etiology of stroke is AFib    -LDL 97- HbA1c 6.2        # AFIB    - started on xarelto - cleared by neurology     - on metoprolol for rate control    -follow up Hg        #anemia- microcytic    low ferritin and iron     started on iron     monitor hg         # positive urinalysis    - completed rocephin course     - Blood Cx negative         #Diet: Speech and swallow evaluated: Dysphagia 1 nectar thick        # Fall precaution, OOB to chair    continue physical therapy

## 2019-04-02 NOTE — PROGRESS NOTE ADULT - SUBJECTIVE AND OBJECTIVE BOX
Patient is a 89y old Female who presents with a chief complaint of s/p fall followed by left sided weakness and facial droop around 11: 30 am (29 Mar 2019 06:59)    Currently admitted to medicine with the primary diagnosis of Stroke    Today is hospital day 1d.      EVENTS LAST 24HRS:   S/p TPA. Pt is AAO x 1. Follows commands.       PAST MEDICAL & SURGICAL HISTORY  Esophageal stricture  Hip fracture  GERD (gastroesophageal reflux disease)  Diverticulosis  Hyperlipidemia  Hypertension  Stroke: no residual defecits  Atrial fibrillation  S/P ORIF (open reduction internal fixation) fracture: left hip  S/P cholecystectomy        REVIEW OF SYSTEMS:  CONSTITUTIONAL: No fever, chills, or fatigue  EYES: No eye pain, visual disturbances, or discharge  ENMT:  No difficulty hearing, tinnitus, vertigo; No sinus or throat pain  NECK: No pain or stiffness  RESPIRATORY: No cough, wheezing, chills or hemoptysis; No shortness of breath  CARDIOVASCULAR: No chest pain, palpitations, dizziness, or leg swelling  GASTROINTESTINAL: No abdominal or epigastric pain. No nausea, vomiting, or hematemesis; No diarrhea or constipation. No melena or hematochezia.  GENITOURINARY: No dysuria, frequency, hematuria, or incontinence  NEUROLOGICAL: No headaches, memory loss, loss of strength, numbness, or tremors  SKIN: No itching, burning, rashes, or lesions   MUSCULOSKELETAL: No joint pain or swelling; No muscle, back, or extremity pain  PSYCHIATRIC: No depression, anxiety, mood swings, or difficulty sleeping      MEDICATIONS:  STANDING MEDICATIONS  atorvastatin 80 milliGRAM(s) Oral at bedtime  cefTRIAXone   IVPB 1 Gram(s) IV Intermittent every 24 hours  metoprolol tartrate 50 milliGRAM(s) Oral two times a day  pantoprazole    Tablet 40 milliGRAM(s) Oral before breakfast    PRN MEDICATIONS    VITALS:   ICU Vital Signs Last 24 Hrs  T(C): 36.2 (29 Mar 2019 15:50), Max: 36.9 (29 Mar 2019 07:00)  T(F): 97.2 (29 Mar 2019 15:50), Max: 98.4 (29 Mar 2019 07:00)  HR: 972 (29 Mar 2019 15:50) (80 - 972)  BP: 139/72 (29 Mar 2019 15:50) (114/84 - 183/99)  BP(mean): --  ABP: --  ABP(mean): --  RR: 18 (29 Mar 2019 15:50) (16 - 20)  SpO2: 97% (29 Mar 2019 15:50) (94% - 98%)      I&O's Detail    28 Mar 2019 07:01  -  29 Mar 2019 07:00  --------------------------------------------------------  IN:  Total IN: 0 mL    OUT:    Indwelling Catheter - Urethral: 160 mL  Total OUT: 160 mL    Total NET: -160 mL      29 Mar 2019 07:01  -  29 Mar 2019 16:13  --------------------------------------------------------  IN:  Total IN: 0 mL    OUT:    Indwelling Catheter - Urethral: 1260 mL  Total OUT: 1260 mL    Total NET: -1260 mL        LABS:                        8.8    8.11  )-----------( 213      ( 29 Mar 2019 08:20 )             32.4     -29    143  |  109  |  17  ----------------------------<  69<L>  4.2   |  21  |  0.7    Ca    8.4<L>      29 Mar 2019 08:20  Phos  3.5       Mg     1.9         TPro  7.0  /  Alb  3.7  /  TBili  0.6  /  DBili  x   /  AST  48<H>  /  ALT  27  /  AlkPhos  117<H>  29      CARDIAC MARKERS ( 29 Mar 2019 00:32 )  x     / 0.01 ng/mL / 50 U/L / x     / x          CAPILLARY BLOOD GLUCOSE      POCT Blood Glucose.: 107 mg/dL (28 Mar 2019 15:16)      Urinalysis Basic - ( 28 Mar 2019 17:20 )    Color: Yellow / Appearance: Turbid / S.025 / pH: x  Gluc: x / Ketone: Negative  / Bili: Negative / Urobili: 1.0 mg/dL   Blood: x / Protein: 100 mg/dL / Nitrite: Negative   Leuk Esterase: Large / RBC: 26-50 /HPF / WBC >50 /HPF   Sq Epi: x / Non Sq Epi: x / Bacteria: x      CULTURES:      PHYSICAL EXAM:    General: AAO x 1     HEENT: Pupils equal, reactive to light.  Symmetric.    PULM: Clear to auscultation bilaterally, no significant sputum production    CVS: Regular rate and rhythm, no murmurs, rubs, or gallops    ABD: Soft, nondistended, nontender, normoactive bowel sounds, no masses    EXT: Weakness in the left leg and right face    SKIN: Warm and well perfused, no rashes noted.    RADIOLOGY:     < from: CT Head No Cont (19 @ 08:12) >  1.  Previously seen loss of gray-white in the right frontal lobe is not   well appreciated on this study. This may represent volume averaging   artifact or may have resolved status post TPA administration.     2.  If symptoms continue to persist MRI of the head may be helpful for   further evaluation.    < end of copied text >
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  As per daughters, they were watching her in camera, last movement they saw was 11: 34, then they didnt see any movement, they found her on the floor in the kitchen, no head trauma, ws complaining of left sided weakness and facial droop. she denies any chest pain, palpitation, no visual symptoms, no dizziness.  she denies any fever, no GI or urinary complaints ( no change in frequency).  she lives alone, is very active, does everything for her daily activities.  while in ER, her NIHSS was 12, ct scan head was normal, she was within the window period was given TPA. NIHSS improved     Neuro follow-up:  Pt seen at bedside this AM feeling well. No new complaints    Vital Signs Last 24 Hrs  T(C): 36.4 (30 Mar 2019 07:50), Max: 37.2 (29 Mar 2019 17:00)  T(F): 97.6 (30 Mar 2019 07:50), Max: 98.9 (29 Mar 2019 17:00)  HR: 100 (30 Mar 2019 07:50) (77 - 100)  BP: 137/78 (30 Mar 2019 07:50) (117/58 - 165/69)  RR: 18 (30 Mar 2019 07:50) (17 - 19)  SpO2: 95% (30 Mar 2019 07:50) (93% - 98%)    NIHSS:     LOC 0  Gaze 0  Facial 0  Visual 0  Motor Arm 0  Motor Leg 0  Sensory 0  Ataxia 0  Language 0  Dysarthria 0  Extinction 0    Total: 0  MRS: 2-3    Allergies    No Known Allergies    Intolerances      MEDICATIONS  (STANDING):  atorvastatin 80 milliGRAM(s) Oral at bedtime  cefTRIAXone   IVPB 1 Gram(s) IV Intermittent every 24 hours  metoprolol tartrate 50 milliGRAM(s) Oral two times a day  pantoprazole    Tablet 40 milliGRAM(s) Oral before breakfast    MEDICATIONS  (PRN):      LABS:                        8.6    8.12  )-----------( 221      ( 30 Mar 2019 06:18 )             32.6     03-30    141  |  108  |  16  ----------------------------<  71  3.8   |  22  |  0.7    Ca    8.1<L>      30 Mar 2019 06:18  Phos  3.5     03-30  Mg     1.9     03-29    TPro  7.0  /  Alb  3.7  /  TBili  0.6  /  DBili  x   /  AST  48<H>  /  ALT  27  /  AlkPhos  117<H>  03-29      Hemoglobin A1C, Whole Blood: 6.2 % (03-29 @ 08:20)        Neuro Imaging:    < from: CT Angio Neck w/ IV Cont (03.28.19 @ 22:10) >  No proximal large vessel occlusion or significant stenosis of the vessels   of the head and neck.    < end of copied text >    < from: MR Head No Cont (03.29.19 @ 17:48) >  1.  Small acute infarcts right insular cortex, frontal lobe and   postcentral gyral cortex as described above.    2.  Small linear foci of signal abnormality on axial gradient echo images   and SWAN images suggestive of petechial or microhemorrhages. No   hemorrhagic conversion or evonne hematomas at this time.     3.  Periventricular and subcortical white matter chronic small vessel   ischemic changes and multiple old lacunar infarcts as described above.    4.  Follow-up CT scan may be helpful for further evaluation of   microhemorrhages prior to starting antiplatelet or anticoagulant therapy.    < end of copied text >
Patient seen and examined at bedside, she had presented yesterday with left sided weakness and left facial droop with an initial NIHSS of 12 was given IV TPA at 4.5 hr window. She improved post tpa with NIHSS 12->4 ->1 and around 8.30 pm last evening she was noted to have an increase in NIHSS to 4. At this time though patient is noted to be very calm in comparison to agitation on prior exam. She is able to answer questions and following 2 step commands. A stat cth and cta h/n was performed . cth revealed a small area of loss of  gray white differentiation in the right frontal lobe .Patient denies headache , nausea or vomiting. Patient was also noted to have paroxysmal AFIB on the cardiac monitor is currently rate controlled .        PMH  Esophageal stricture  Hip fracture  GERD (gastroesophageal reflux disease)  Diverticulosis  Hyperlipidemia  Hypertension  Stroke  Atrial fibrillation          Pertinent Medical History/Social History/Family History/other:     Social History: []  Drug Use: []   Alcohol Use: []   Tobacco Use:  [] Other:      Cerebrovascular Risk Factors:[X] prior ischemic stroke  [X] Afib  []CAD  [X]HTN  [X]DLD  []DM []PVD      Stroke Workup:    Cardiac Rhythm(Tele/Holter) & Duration: 1 day                   Events: none    Cardiac Structure:(TTE/KENNETH +/-): Pending      Radiology    < from: CT Head No Cont (19 @ 22:08) >  indings:    The ventricles, basal cisterns and sulcal pattern are prominent   consistent with age-related parenchymal volume loss, stable.    Suggestion of a subtle loss of gray-white differentiation within the   right frontal lobe (series5/36).    There is no acute intracranial hemorrhage, extra axial fluid collection   or midline shift.    There are patchy hypodensities in the periventricular and subcortical   white matter without mass effect compatible with chronic microvascular   changes.      There is calcific atherosclerotic disease at the skull base.    The calvarium is intact. The visualized paranasal sinuses and mastoid air   cells are unremarkable.        IMPRESSION:     1.  Suggestion of subtle loss of gray-white differentiation within the   right frontal lobe. Follow-up MRI of the brain may be helpful for   confirmation.    2.  Moderate chronic microvascular changes.      < end of copied text >            < from: CT Angio Neck w/ IV Cont (19 @ 22:10) >  Findings:     NECK:  Aorta: The visualized aortic arch is patent. There is calcified   atherosclerosis at the great vessel origins without significant stenosis.    There is calcified atherosclerosis of the bilateral common carotid   arteries at the bifurcation without significant stenosis. The bilateral   internal carotid arteries are patent.    Posterior circulation:The vertebral arteries are patent bilaterally.    HEAD:  There is calcified atherosclerosis of the bilateral clinoid subclinoid   internal carotid arteries without significant stenosis. The anterior and   middle cerebral arteries are patent. The anterior communicating artery is   unremarkable.    The distal vertebral arteries are right dominant and appear patent. The   basilar artery is patent. The posterior cerebral arteries are patent.    OTHER: There is a hypodense 0.5 cm left thyroid lobe nodule. Degenerative   changes of the cervical spine.      IMPRESSION:     No large vessel occlusion or significant stenosis of the vessels of the   head and neck.      < end of copied text >          Home Medications:  aspirin 81 mg oral tablet, chewable: 1 tab(s) orally once a day (19 Oct 2018 11:36)  metoprolol tartrate 25 mg oral tablet: 1 tab(s) orally 2 times a day (19 Oct 2018 11:36)      MEDICATIONS  (STANDING):  atorvastatin 40 milliGRAM(s) Oral at bedtime  cefTRIAXone   IVPB 1 Gram(s) IV Intermittent every 24 hours  metoprolol tartrate 50 milliGRAM(s) Oral two times a day  pantoprazole    Tablet 40 milliGRAM(s) Oral before breakfast      Last 24 hour events:none    Physical Exam:  patient a/o x 2 following 2 step commands , very [place], speech fluent  cn: dense left facial droop  power: RUE 4+/5   RLE 4+/5             LUE 4/5      LLE  4+/5 (LEFT ARM DRIFT)  FTN: slow but no dysmetria  hks no ataxia  sensory : intact  no neglect      Vital Signs Last 24 Hrs  T(C): 36.6 (28 Mar 2019 17:56), Max: 36.7 (28 Mar 2019 15:30)  T(F): 97.9 (28 Mar 2019 17:56), Max: 98.1 (28 Mar 2019 17:31)  HR: 87 (29 Mar 2019 02:00) (85 - 158)  BP: 124/61 (29 Mar 2019 02:00) (114/84 - 183/99)  BP(mean): --  RR: 18 (29 Mar 2019 01:48) (16 - 20)  SpO2: 95% (29 Mar 2019 01:48) (95% - 99%)    NIHSS  LOC: 0    QUES: 1    COMM:  0   VF:  0   GAZE: 0    RUE: 0    RLE: 0    LUE:1     LLE: 0    SENS:0     LAN    SPEECH: 0    ATAXIA:0     NEGLECT:  0   FACE: 2 (facial)    NIHSS today: 4      PT/OT/Speech/Rehab/other: Pending    m-RS: 3  mR-s baseline : 1
Patient was seen and examined. Spoke with RN. Chart reviewed.  awake, alert, confused,      No events overnight.  Vital Signs Last 24 Hrs  T(F): 97.2 (02 Apr 2019 13:40), Max: 97.2 (02 Apr 2019 13:40)  HR: 81 (02 Apr 2019 13:40) (79 - 89)  BP: 101/52 (02 Apr 2019 13:40) (101/52 - 145/80)  SpO2: --  MEDICATIONS  (STANDING):  atorvastatin 80 milliGRAM(s) Oral at bedtime  cefTRIAXone   IVPB 1 Gram(s) IV Intermittent every 24 hours  docusate sodium 100 milliGRAM(s) Oral daily  ferrous    sulfate 325 milliGRAM(s) Oral daily  iron sucrose IVPB 100 milliGRAM(s) IV Intermittent every 24 hours  metoprolol tartrate 50 milliGRAM(s) Oral two times a day  pantoprazole    Tablet 40 milliGRAM(s) Oral before breakfast  rivaroxaban 20 milliGRAM(s) Oral every 24 hours  senna 2 Tablet(s) Oral at bedtime    MEDICATIONS  (PRN):    Labs:                        8.6    7.48  )-----------( 210      ( 02 Apr 2019 05:42 )             32.5                         8.1    8.09  )-----------( 219      ( 01 Apr 2019 05:00 )             30.3     02 Apr 2019 05:42    141    |  104    |  12     ----------------------------<  99     4.2     |  25     |  0.6    01 Apr 2019 05:00    139    |  105    |  13     ----------------------------<  87     4.3     |  24     |  0.5      Ca    8.3        02 Apr 2019 05:42  Ca    7.8        01 Apr 2019 05:00  Mg     1.9       02 Apr 2019 05:42    TPro  5.3    /  Alb  2.9    /  TBili  0.3    /  DBili  x      /  AST  13     /  ALT  9      /  AlkPhos  83     01 Apr 2019 05:00            Radiology:    General: comfortable, NAD  Neurology: Alert, confused  Head:  Normocephalic, atraumatic  ENT:  Mucosa moist, no ulcerations  Neck:  Supple,   Resp: CTA B/L  CV: RRR, S1S2,   GI: Soft, NT, bowel sounds  MS: No edema, + peripheral pulses, FROM all 4 extremity
TRUMAN LEAL    Chief Complaint:    Handed    HPI:  89 years old female pt with past medical hx of Afib was on xarelto and metoprolol but non compliant, DLD, old CVA with no residual weakness, GERD, diverticulosis brought to ER because of left sided weakness and facial droop.  As per daughters, they were watching her in camera, last movement they saw was 11: 34, then they didnt see any movement, they found her on the floor in the kitchen, no head trauma, ws complaining of left sided weakness and facial droop. she denies any chest pain, palpitation, no visual symptoms, no dizziness.  she denies any fever, no GI or urinary complaints ( no change in frequency).  she lives alone, is very active, does everything for her daily activities.  while in ER, her NIHSS was 12, ct scan head was normal, she was within the window period was given TPA. NIHSS improved from 12--4, neurology on board.  she was admitted in october 2018 because of UTI. (28 Mar 2019 20:01)      Patient is examined at the bedside this afternoon. She is aaax3, answers questions, follows commands. Patient got very agitated over night, and was given Ativan, which kept her drowsy till afternoon. Currently she is at the bedside chair, eating lunch, no complaints.       Relevant PMH:  [] Prior ischemic stroke/TIA  [] Afib  []CAD  []HTN  []DLD  []DM []PVD []Obesity [] Sedintary lifestyle []CHF  []MICHELLE  []Cancer Hx     Social History: [] Smoking []  Drug Use: []   Alcohol Use:   [] Other:      Possible Location of Stroke:    Possible Cause of Stroke:    Relevant Cerebral Imaging:    Relevant Cervicocerebral Imaging:  CT Angio Neck w/ IV Cont:   EXAM:  CT ANGIO NECK (W)AW IC        EXAM:  CT ANGIO BRAIN (W)AW IC            PROCEDURE DATE:  03/28/2019            INTERPRETATION:  Clinical History / Reason for exam: Left-sided weakness.     Technique: CT angiogram of the head and neck. CTA ofthe head and neck   was performed following the intravenous administration of 100 cc Optiray   350 (0 cc discarded) with coronal, sagittal and multiple 3-D MIP and   volume rendered reformats.    Comparison: CT head of the same date    Findings:     CTA NECK:    Aorta: The visualized aortic arch is patent. There is calcified   atherosclerosis at the great vessel origins without significant stenosis.    There is calcified atherosclerosis of the bilateral common carotid   arteries at the bifurcation without significant stenosis. The bilateral   internal carotid arteries are patent.    Posterior circulation: The vertebral arteries are patent bilaterally.    CTA HEAD:    There is calcified atherosclerosis of the bilateral cavernous internal   carotid arteries without significant stenosis. The anterior and middle   cerebral arteries are patent. The anterior communicating artery is   unremarkable. There is trifurcation of the A2 segments, normal variant.    The distal vertebral arteries are right dominant and appear patent. The   basilar artery is patent. The posterior cerebral arteries are patent.    OTHER: There is a hypodense 0.5 cm left thyroid lobe nodule. Degenerative   changes of the cervical spine.      IMPRESSION:     No proximal large vessel occlusion or significant stenosis of the vessels   of the head and neck.                MAYA MAIN M.D., RESIDENT RADIOLOGIST  This document has been electronically signed.  TORIBIO BENAVIDEZ M.D., ATTENDING RADIOLOGIST  This document has been electronically signed. Mar 29 2019  4:50PM             (03-28-19 @ 22:10)    CT Angio Head w/ IV Cont:   EXAM:  CT ANGIO NECK (W)AW IC        EXAM:  CT ANGIO BRAIN (W)AW IC            PROCEDURE DATE:  03/28/2019            INTERPRETATION:  Clinical History / Reason for exam: Left-sided weakness.     Technique: CT angiogram of the head and neck. CTA ofthe head and neck   was performed following the intravenous administration of 100 cc Optiray   350 (0 cc discarded) with coronal, sagittal and multiple 3-D MIP and   volume rendered reformats.    Comparison: CT head of the same date    Findings:     CTA NECK:    Aorta: The visualized aortic arch is patent. There is calcified   atherosclerosis at the great vessel origins without significant stenosis.    There is calcified atherosclerosis of the bilateral common carotid   arteries at the bifurcation without significant stenosis. The bilateral   internal carotid arteries are patent.    Posterior circulation: The vertebral arteries are patent bilaterally.    CTA HEAD:    There is calcified atherosclerosis of the bilateral cavernous internal   carotid arteries without significant stenosis. The anterior and middle   cerebral arteries are patent. The anterior communicating artery is   unremarkable. There is trifurcation of the A2 segments, normal variant.    The distal vertebral arteries are right dominant and appear patent. The   basilar artery is patent. The posterior cerebral arteries are patent.    OTHER: There is a hypodense 0.5 cm left thyroid lobe nodule. Degenerative   changes of the cervical spine.      IMPRESSION:     No proximal large vessel occlusion or significant stenosis of the vessels   of the head and neck.                MAYA MAIN M.D., RESIDENT RADIOLOGIST  This document has been electronically signed.  TORIBIO BENAVIDEZ M.D., ATTENDING RADIOLOGIST  This document has been electronically signed. Mar 29 2019  4:50PM             (03-28-19 @ 22:09)        Relevant blood tests:  Direct LDL: 96 mg/dL [4 - 129] (03-29-19 @ 08:20)  Hemoglobin A1C, Whole Blood: 6.2 % (03-29-19 @ 08:20)      Relevant cardiac rhythm monitoring:    Relevant Cardiac Structure:(TTE/KENNETH +/-):[]No intracardiac thrombus/[] no vegetation/[]no [place]/EF:      Home Medications:  aspirin 81 mg oral tablet, chewable: 1 tab(s) orally once a day (19 Oct 2018 11:36)  metoprolol tartrate 25 mg oral tablet: 1 tab(s) orally 2 times a day (19 Oct 2018 11:36)      MEDICATIONS  (STANDING):  atorvastatin 80 milliGRAM(s) Oral at bedtime  cefTRIAXone   IVPB 1 Gram(s) IV Intermittent every 24 hours  metoprolol tartrate 50 milliGRAM(s) Oral two times a day  pantoprazole    Tablet 40 milliGRAM(s) Oral before breakfast      PT/OT/Speech/Rehab/S&Swr:    Exam:    Vital Signs Last 24 Hrs  T(C): 35.8 (31 Mar 2019 14:30), Max: 36.1 (30 Mar 2019 18:17)  T(F): 96.5 (31 Mar 2019 14:30), Max: 96.9 (30 Mar 2019 18:17)  HR: 99 (31 Mar 2019 14:30) (83 - 111)  BP: 119/58 (31 Mar 2019 14:30) (119/58 - 158/73)  BP(mean): --  RR: 20 (31 Mar 2019 14:30) (19 - 24)  SpO2: 96% (30 Mar 2019 18:17) (96% - 96%)    NIHSS      LOC:       1a: 0    1b(Questions): 0          1c(Instructions):0             Best Gaze:0  Visual:0  Motor:                 RUE:  0   RLE:  0   LUE: 0    LLE: 0    FACE:   0  Limb Ataxia:0  Sensory:    0   Language: 0      Dysarthria:  0        Extinction and Inattention:0    NIHSS on admission:          NIHSS yesterday:   0       NIHSS today:  0           m-RS:2-3    Impression:      Suggestion:  Routine stroke workup including:    Disposition:
TRUMAN LEAL 89y Female  MRN#: [id number]   CODE STATUS -- FULL CODE    SUBJECTIVE  Patient is a 89y old Female who presents with a chief complaint of s/p fall followed by left sided weakness and facial droop around 11: 30 am (31 Mar 2019 15:23)  Currently admitted to medicine with the primary diagnosis of Stroke     Today is hospital day 4d, and this morning she is stable - no major overnight events  she was on 1:1 sit for agitation   but when seen throughout day patient was calm       OBJECTIVE  PAST MEDICAL & SURGICAL HISTORY  Esophageal stricture  Hip fracture  GERD (gastroesophageal reflux disease)  Diverticulosis  Hyperlipidemia  Hypertension  Stroke: no residual defecits  Atrial fibrillation  S/P ORIF (open reduction internal fixation) fracture: left hip  S/P cholecystectomy    ALLERGIES:  No Known Allergies    MEDICATIONS:  STANDING MEDICATIONS  atorvastatin 80 milliGRAM(s) Oral at bedtime  cefTRIAXone   IVPB 1 Gram(s) IV Intermittent every 24 hours  docusate sodium 100 milliGRAM(s) Oral daily  iron sucrose IVPB 100 milliGRAM(s) IV Intermittent every 24 hours  metoprolol tartrate 50 milliGRAM(s) Oral two times a day  pantoprazole    Tablet 40 milliGRAM(s) Oral before breakfast  senna 2 Tablet(s) Oral at bedtime    PRN MEDICATIONS      Home Medications  Home Medications:  aspirin 81 mg oral tablet, chewable: 1 tab(s) orally once a day (19 Oct 2018 11:36)  metoprolol tartrate 25 mg oral tablet: 1 tab(s) orally 2 times a day (19 Oct 2018 11:36)      VITAL SIGNS: Last 24 Hours  T(C): 35.6 (01 Apr 2019 05:28), Max: 36.2 (31 Mar 2019 21:15)  T(F): 96 (01 Apr 2019 05:28), Max: 97.2 (31 Mar 2019 21:15)  HR: 87 (01 Apr 2019 05:28) (77 - 99)  BP: 123/64 (01 Apr 2019 05:28) (119/58 - 137/84)  BP(mean): --  RR: 18 (01 Apr 2019 05:28) (16 - 20)  SpO2: --    LABS:                        8.1    8.09  )-----------( 219      ( 01 Apr 2019 05:00 )             30.3     04-01    139  |  105  |  13  ----------------------------<  87  4.3   |  24  |  0.5<L>    Ca    7.8<L>      01 Apr 2019 05:00    TPro  5.3<L>  /  Alb  2.9<L>  /  TBili  0.3  /  DBili  x   /  AST  13  /  ALT  9   /  AlkPhos  83  04-01    RADIOLOGY:    REPEAT CT BRAIN pending     < from: MR Head No Cont (03.29.19 @ 17:48) >  IMPRESSION:    1.  Small acute infarcts right insular cortex, frontal lobe and   postcentral gyral cortex as described above.    2.  Small linear foci of signal abnormality on axial gradient echo images   and SWAN images suggestive of petechial or microhemorrhages. No   hemorrhagic conversion or evonne hematomas at this time.     3.  Periventricular and subcortical white matter chronic small vessel   ischemic changes and multiple old lacunar infarcts as described above.    4.  Follow-up CT scan may be helpful for further evaluation of   microhemorrhages prior to starting antiplatelet or anticoagulant therapy.      < end of copied text >    < from: CT Angio head and Neck w/ IV Cont (03.28.19 @ 22:10) >  IMPRESSION:     No proximal large vessel occlusion or significant stenosis of the vessels   of the head and neck.        < end of copied text >    < from: Transthoracic Echocardiogram (03.31.19 @ 08:29) >       Summary:   1. Left ventricular ejection fraction, by visual estimation, is 45 to   50%.   2. Mildly decreased global left ventricular systolic function.   3. Mildly enlarged right atrium.   4. Moderate mitral annular calcification.   5. Mild tricuspid regurgitation.   6. Trace pulmonic valve regurgitation.   7. LA volume Index is 33.9 ml/m² ml/m2.   8. There appears to be a PFO present on color doppler.    < end of copied text >    PHYSICAL EXAM:    GENERAL: NAD, well-developed, AAOx2  HEENT:  Atraumatic, Normocephalic. EOMI, PERRLA, conjunctiva and sclera clear, No JVD  PULMONARY: Clear to auscultation bilaterally; No wheeze  CARDIOVASCULAR: Regular rate and rhythm; No murmurs, rubs, or gallops  GASTROINTESTINAL: Soft, Nontender, Nondistended; Bowel sounds present  MUSCULOSKELETAL:  2+ Peripheral Pulses, No clubbing, cyanosis, or edema  NEUROLOGY: non-focal  motor 5/5- sensory intact   SKIN: No rashes or lesions
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      OBJECTIVE  PAST MEDICAL & SURGICAL HISTORY  Esophageal stricture  Hip fracture  GERD (gastroesophageal reflux disease)  Diverticulosis  Hyperlipidemia  Hypertension  Stroke: no residual defecits  Atrial fibrillation  S/P ORIF (open reduction internal fixation) fracture: left hip  S/P cholecystectomy    ALLERGIES:  No Known Allergies    MEDICATIONS:  STANDING MEDICATIONS  atorvastatin 80 milliGRAM(s) Oral at bedtime  cefTRIAXone   IVPB 1 Gram(s) IV Intermittent every 24 hours  metoprolol tartrate 50 milliGRAM(s) Oral two times a day  pantoprazole    Tablet 40 milliGRAM(s) Oral before breakfast    PRN MEDICATIONS      VITAL SIGNS: Last 24 Hours  T(C): 35.9 (31 Mar 2019 10:28), Max: 36.2 (30 Mar 2019 14:01)  T(F): 96.7 (31 Mar 2019 10:28), Max: 97.2 (30 Mar 2019 14:01)  HR: 83 (31 Mar 2019 10:28) (83 - 111)  BP: 131/63 (31 Mar 2019 10:28) (109/53 - 158/73)  BP(mean): --  RR: 20 (31 Mar 2019 10:28) (18 - 24)  SpO2: 96% (30 Mar 2019 18:17) (96% - 96%)    LABS:                        8.1    8.81  )-----------( 205      ( 31 Mar 2019 05:44 )             30.2     03-31    140  |  106  |  18  ----------------------------<  96  3.7   |  24  |  0.6<L>    Ca    7.9<L>      31 Mar 2019 05:44  Phos  3.5     03-30    TPro  5.4<L>  /  Alb  3.0<L>  /  TBili  0.4  /  DBili  x   /  AST  14  /  ALT  11  /  AlkPhos  85  03-31              Culture - Blood (collected 29 Mar 2019 00:32)  Source: .Blood None  Preliminary Report (30 Mar 2019 14:00):    No growth to date.          RADIOLOGY:      PHYSICAL EXAM:    GENERAL: NAD, well-developed, AAOx3  HEENT:  Atraumatic, Normocephalic. EOMI, PERRLA, conjunctiva and sclera clear, No JVD  PULMONARY: Clear to auscultation bilaterally; No wheeze  CARDIOVASCULAR: Regular rate and rhythm; No murmurs, rubs, or gallops  GASTROINTESTINAL: Soft, Nontender, Nondistended; Bowel sounds present  MUSCULOSKELETAL:  2+ Peripheral Pulses, No clubbing, cyanosis, or edema  NEUROLOGY: non-focal  SKIN: No rashes or lesions    Assessment and Plan  # Ischemic Stroke  - s/p TPA/ symptoms improving  - MRI + for acute infarcts right insular cortex, frontal lobe and postcentral gyral cortex. Micro-hemorrhages on MRI as well.  - F/u CT head performed today pending official read  - PFO on TTE  - Neurolgy following  -PT/rehab  - Patient somnolent this morning/Given Ativan last night/ Will Avoid Ativan in case agitated again    # AFIB  - OFF AC for now pending repeat CT results  - F/u with neurology if AC can be restarted tomorrow  - on metoprolol for rate control    # positive urinalysis  - C/w Rocephin   - Blood Cx negative  - F/u urine Cx       # DVT prophylaxis sequential compression    #Diet: Speech and swallow evaluated: Dysphagia 1 nectar thick    # Fall precaution, OOB to chair

## 2019-04-02 NOTE — DISCHARGE NOTE NURSING/CASE MANAGEMENT/SOCIAL WORK - NSDCDPATPORTLINK_GEN_ALL_CORE
You can access the [facility] Patient Portal, offered by [facility], by registering with the following website: [email or web address] [email or web address]

## 2019-04-02 NOTE — DISCHARGE NOTE NURSING/CASE MANAGEMENT/SOCIAL WORK - NSDCPEPTSTRK_GEN_ALL_CORE
Stroke education booklet/Risk factors for stroke/Stroke warning signs and symptoms/Signs and symptoms of stroke/Need for follow up after discharge/Stroke support groups for patients, families, and friends/Call 911 for stroke/Prescribed medications

## 2019-04-02 NOTE — DISCHARGE NOTE PROVIDER - NSDCCPCAREPLAN_GEN_ALL_CORE_FT
PRINCIPAL DISCHARGE DIAGNOSIS  Diagnosis: Stroke  Assessment and Plan of Treatment: you have an Ischemic Stroke with MRI showing Small acute infarcts right insular cortex, frontal lobe and   postcentral gyral cortex - it is likely from Afib   - you received  TPA/ with symptoms improving completely resolving with no residual deficits   - F/u CT head neg for bleed started by neurology on AC xarelto   -LDL 97- HbA1c 6.2      SECONDARY DISCHARGE DIAGNOSES  Diagnosis: Atrial fibrillation  Assessment and Plan of Treatment: started on xarelto and is on metoprololl   monitor HR with target <110 beats/min  monitor for any signs of bleed  and always keep on fall precautions

## 2019-04-02 NOTE — PROGRESS NOTE ADULT - ASSESSMENT
isch acute cva/s/p tpa  a fib  anemia of chr disease  uti  dementia    change to po vantin total 7 days if urine cx negative  ac as per neuro  rehab pt  dc planning

## 2019-04-02 NOTE — DISCHARGE NOTE PROVIDER - CARE PROVIDER_API CALL
Rei Mittal)  Internal Medicine  [address] [place] [address]  Phone: [phone]  Fax: [phone]  Follow Up Time:     Lionel Enriquez)  EEGEpilepsy; Neurology  [address] [place] [address], [address]  [place] [address]  Phone: [phone]  Fax: [phone]  Follow Up Time:

## 2019-04-02 NOTE — PHARMACOTHERAPY INTERVENTION NOTE - COMMENTS
spoke with dr wharton. ferrous sulfate and iron sucrose not a duplicate therapy. iron sucrose for 3 days only to fix iron level in addition with daily ferrous sulfate

## 2019-04-02 NOTE — PROGRESS NOTE ADULT - REASON FOR ADMISSION
s/p fall followed by left sided weakness and facial droop around 11: 30 am

## 2019-04-03 LAB
CULTURE RESULTS: SIGNIFICANT CHANGE UP
GLUCOSE BLDC GLUCOMTR-MCNC: 150 MG/DL — HIGH (ref 70–99)
SPECIMEN SOURCE: SIGNIFICANT CHANGE UP

## 2019-04-03 RX ORDER — DOCUSATE SODIUM 100 MG
100 CAPSULE ORAL DAILY
Qty: 0 | Refills: 0 | Status: DISCONTINUED | OUTPATIENT
Start: 2019-04-03 | End: 2019-04-03

## 2019-04-03 RX ORDER — SODIUM CHLORIDE 9 MG/ML
1000 INJECTION INTRAMUSCULAR; INTRAVENOUS; SUBCUTANEOUS
Qty: 0 | Refills: 0 | Status: DISCONTINUED | OUTPATIENT
Start: 2019-04-03 | End: 2019-04-19

## 2019-04-03 RX ADMIN — Medication 325 MILLIGRAM(S): at 13:29

## 2019-04-03 RX ADMIN — RIVAROXABAN 20 MILLIGRAM(S): KIT at 21:15

## 2019-04-03 RX ADMIN — Medication 50 MILLIGRAM(S): at 21:15

## 2019-04-03 RX ADMIN — IRON SUCROSE 210 MILLIGRAM(S): 20 INJECTION, SOLUTION INTRAVENOUS at 21:14

## 2019-04-03 RX ADMIN — Medication 50 MILLIGRAM(S): at 05:45

## 2019-04-03 RX ADMIN — CEFTRIAXONE 100 GRAM(S): 500 INJECTION, POWDER, FOR SOLUTION INTRAMUSCULAR; INTRAVENOUS at 05:46

## 2019-04-03 RX ADMIN — SODIUM CHLORIDE 60 MILLILITER(S): 9 INJECTION INTRAMUSCULAR; INTRAVENOUS; SUBCUTANEOUS at 11:33

## 2019-04-03 RX ADMIN — ATORVASTATIN CALCIUM 80 MILLIGRAM(S): 80 TABLET, FILM COATED ORAL at 21:15

## 2019-04-03 RX ADMIN — PANTOPRAZOLE SODIUM 40 MILLIGRAM(S): 20 TABLET, DELAYED RELEASE ORAL at 05:46

## 2019-04-04 RX ADMIN — RIVAROXABAN 20 MILLIGRAM(S): KIT at 16:56

## 2019-04-04 RX ADMIN — CEFTRIAXONE 100 GRAM(S): 500 INJECTION, POWDER, FOR SOLUTION INTRAMUSCULAR; INTRAVENOUS at 04:52

## 2019-04-04 RX ADMIN — Medication 50 MILLIGRAM(S): at 16:58

## 2019-04-04 RX ADMIN — Medication 325 MILLIGRAM(S): at 14:10

## 2019-04-04 RX ADMIN — IRON SUCROSE 210 MILLIGRAM(S): 20 INJECTION, SOLUTION INTRAVENOUS at 21:01

## 2019-04-04 RX ADMIN — Medication 650 MILLIGRAM(S): at 04:54

## 2019-04-04 RX ADMIN — Medication 650 MILLIGRAM(S): at 05:07

## 2019-04-04 RX ADMIN — PANTOPRAZOLE SODIUM 40 MILLIGRAM(S): 20 TABLET, DELAYED RELEASE ORAL at 06:03

## 2019-04-04 RX ADMIN — ATORVASTATIN CALCIUM 80 MILLIGRAM(S): 80 TABLET, FILM COATED ORAL at 21:02

## 2019-04-04 RX ADMIN — Medication 50 MILLIGRAM(S): at 05:06

## 2019-04-05 DIAGNOSIS — R29.810 FACIAL WEAKNESS: ICD-10-CM

## 2019-04-05 DIAGNOSIS — Z87.440 PERSONAL HISTORY OF URINARY (TRACT) INFECTIONS: ICD-10-CM

## 2019-04-05 DIAGNOSIS — Z91.14 PATIENT'S OTHER NONCOMPLIANCE WITH MEDICATION REGIMEN: ICD-10-CM

## 2019-04-05 DIAGNOSIS — Q21.1 ATRIAL SEPTAL DEFECT: ICD-10-CM

## 2019-04-05 DIAGNOSIS — Z86.73 PERSONAL HISTORY OF TRANSIENT ISCHEMIC ATTACK (TIA), AND CEREBRAL INFARCTION WITHOUT RESIDUAL DEFICITS: ICD-10-CM

## 2019-04-05 DIAGNOSIS — I61.1 NONTRAUMATIC INTRACEREBRAL HEMORRHAGE IN HEMISPHERE, CORTICAL: ICD-10-CM

## 2019-04-05 DIAGNOSIS — D63.8 ANEMIA IN OTHER CHRONIC DISEASES CLASSIFIED ELSEWHERE: ICD-10-CM

## 2019-04-05 DIAGNOSIS — I63.511 CEREBRAL INFARCTION DUE TO UNSPECIFIED OCCLUSION OR STENOSIS OF RIGHT MIDDLE CEREBRAL ARTERY: ICD-10-CM

## 2019-04-05 DIAGNOSIS — I48.0 PAROXYSMAL ATRIAL FIBRILLATION: ICD-10-CM

## 2019-04-05 DIAGNOSIS — I63.9 CEREBRAL INFARCTION, UNSPECIFIED: ICD-10-CM

## 2019-04-05 DIAGNOSIS — R16.0 HEPATOMEGALY, NOT ELSEWHERE CLASSIFIED: ICD-10-CM

## 2019-04-05 DIAGNOSIS — Z79.82 LONG TERM (CURRENT) USE OF ASPIRIN: ICD-10-CM

## 2019-04-05 DIAGNOSIS — R47.81 SLURRED SPEECH: ICD-10-CM

## 2019-04-05 DIAGNOSIS — Z87.81 PERSONAL HISTORY OF (HEALED) TRAUMATIC FRACTURE: ICD-10-CM

## 2019-04-05 DIAGNOSIS — I71.2 THORACIC AORTIC ANEURYSM, WITHOUT RUPTURE: ICD-10-CM

## 2019-04-05 DIAGNOSIS — K21.9 GASTRO-ESOPHAGEAL REFLUX DISEASE WITHOUT ESOPHAGITIS: ICD-10-CM

## 2019-04-05 DIAGNOSIS — Z79.01 LONG TERM (CURRENT) USE OF ANTICOAGULANTS: ICD-10-CM

## 2019-04-05 DIAGNOSIS — G81.94 HEMIPLEGIA, UNSPECIFIED AFFECTING LEFT NONDOMINANT SIDE: ICD-10-CM

## 2019-04-05 DIAGNOSIS — I10 ESSENTIAL (PRIMARY) HYPERTENSION: ICD-10-CM

## 2019-04-05 DIAGNOSIS — R13.19 OTHER DYSPHAGIA: ICD-10-CM

## 2019-04-05 DIAGNOSIS — F03.91 UNSPECIFIED DEMENTIA WITH BEHAVIORAL DISTURBANCE: ICD-10-CM

## 2019-04-05 DIAGNOSIS — N39.0 URINARY TRACT INFECTION, SITE NOT SPECIFIED: ICD-10-CM

## 2019-04-05 DIAGNOSIS — Z90.49 ACQUIRED ABSENCE OF OTHER SPECIFIED PARTS OF DIGESTIVE TRACT: ICD-10-CM

## 2019-04-05 DIAGNOSIS — K57.90 DIVERTICULOSIS OF INTESTINE, PART UNSPECIFIED, WITHOUT PERFORATION OR ABSCESS WITHOUT BLEEDING: ICD-10-CM

## 2019-04-05 DIAGNOSIS — E78.5 HYPERLIPIDEMIA, UNSPECIFIED: ICD-10-CM

## 2019-04-05 DIAGNOSIS — R29.712 NIHSS SCORE 12: ICD-10-CM

## 2019-04-05 RX ORDER — LANOLIN ALCOHOL/MO/W.PET/CERES
5 CREAM (GRAM) TOPICAL AT BEDTIME
Qty: 0 | Refills: 0 | Status: DISCONTINUED | OUTPATIENT
Start: 2019-04-05 | End: 2019-04-19

## 2019-04-05 RX ADMIN — Medication 50 MILLIGRAM(S): at 17:30

## 2019-04-05 RX ADMIN — Medication 325 MILLIGRAM(S): at 13:30

## 2019-04-05 RX ADMIN — PANTOPRAZOLE SODIUM 40 MILLIGRAM(S): 20 TABLET, DELAYED RELEASE ORAL at 07:17

## 2019-04-05 RX ADMIN — Medication 50 MILLIGRAM(S): at 07:17

## 2019-04-05 RX ADMIN — Medication 5 MILLIGRAM(S): at 22:08

## 2019-04-05 RX ADMIN — RIVAROXABAN 20 MILLIGRAM(S): KIT at 17:30

## 2019-04-05 RX ADMIN — ATORVASTATIN CALCIUM 80 MILLIGRAM(S): 80 TABLET, FILM COATED ORAL at 22:08

## 2019-04-06 RX ADMIN — Medication 5 MILLIGRAM(S): at 21:22

## 2019-04-06 RX ADMIN — RIVAROXABAN 20 MILLIGRAM(S): KIT at 18:31

## 2019-04-06 RX ADMIN — PANTOPRAZOLE SODIUM 40 MILLIGRAM(S): 20 TABLET, DELAYED RELEASE ORAL at 06:26

## 2019-04-06 RX ADMIN — Medication 325 MILLIGRAM(S): at 12:12

## 2019-04-06 RX ADMIN — Medication 50 MILLIGRAM(S): at 05:22

## 2019-04-06 RX ADMIN — Medication 50 MILLIGRAM(S): at 18:30

## 2019-04-06 RX ADMIN — ATORVASTATIN CALCIUM 80 MILLIGRAM(S): 80 TABLET, FILM COATED ORAL at 21:22

## 2019-04-07 LAB
C DIFF BY PCR RESULT: NEGATIVE — SIGNIFICANT CHANGE UP
C DIFF TOX GENS STL QL NAA+PROBE: SIGNIFICANT CHANGE UP
GLUCOSE BLDC GLUCOMTR-MCNC: 107 MG/DL — HIGH (ref 70–99)

## 2019-04-07 RX ADMIN — Medication 50 MILLIGRAM(S): at 18:55

## 2019-04-07 RX ADMIN — RIVAROXABAN 20 MILLIGRAM(S): KIT at 18:56

## 2019-04-07 RX ADMIN — Medication 50 MILLIGRAM(S): at 06:32

## 2019-04-07 RX ADMIN — PANTOPRAZOLE SODIUM 40 MILLIGRAM(S): 20 TABLET, DELAYED RELEASE ORAL at 06:32

## 2019-04-07 RX ADMIN — Medication 325 MILLIGRAM(S): at 11:43

## 2019-04-08 LAB
ANION GAP SERPL CALC-SCNC: 12 MMOL/L — SIGNIFICANT CHANGE UP (ref 7–14)
BASOPHILS # BLD AUTO: 0.11 K/UL — SIGNIFICANT CHANGE UP (ref 0–0.2)
BASOPHILS NFR BLD AUTO: 1.2 % — HIGH (ref 0–1)
BUN SERPL-MCNC: 12 MG/DL — SIGNIFICANT CHANGE UP (ref 10–20)
CALCIUM SERPL-MCNC: 8.6 MG/DL — SIGNIFICANT CHANGE UP (ref 8.5–10.1)
CHLORIDE SERPL-SCNC: 104 MMOL/L — SIGNIFICANT CHANGE UP (ref 98–110)
CO2 SERPL-SCNC: 24 MMOL/L — SIGNIFICANT CHANGE UP (ref 17–32)
CREAT SERPL-MCNC: 0.7 MG/DL — SIGNIFICANT CHANGE UP (ref 0.7–1.5)
EOSINOPHIL # BLD AUTO: 0.39 K/UL — SIGNIFICANT CHANGE UP (ref 0–0.7)
EOSINOPHIL NFR BLD AUTO: 4.2 % — SIGNIFICANT CHANGE UP (ref 0–8)
GLUCOSE SERPL-MCNC: 89 MG/DL — SIGNIFICANT CHANGE UP (ref 70–99)
HCT VFR BLD CALC: 41.2 % — SIGNIFICANT CHANGE UP (ref 37–47)
HGB BLD-MCNC: 11 G/DL — LOW (ref 12–16)
IMM GRANULOCYTES NFR BLD AUTO: 0.5 % — HIGH (ref 0.1–0.3)
LYMPHOCYTES # BLD AUTO: 1.3 K/UL — SIGNIFICANT CHANGE UP (ref 1.2–3.4)
LYMPHOCYTES # BLD AUTO: 14.1 % — LOW (ref 20.5–51.1)
MAGNESIUM SERPL-MCNC: 2.1 MG/DL — SIGNIFICANT CHANGE UP (ref 1.8–2.4)
MCHC RBC-ENTMCNC: 20.6 PG — LOW (ref 27–31)
MCHC RBC-ENTMCNC: 26.7 G/DL — LOW (ref 32–37)
MCV RBC AUTO: 77.2 FL — LOW (ref 81–99)
MONOCYTES # BLD AUTO: 0.73 K/UL — HIGH (ref 0.1–0.6)
MONOCYTES NFR BLD AUTO: 7.9 % — SIGNIFICANT CHANGE UP (ref 1.7–9.3)
NEUTROPHILS # BLD AUTO: 6.61 K/UL — HIGH (ref 1.4–6.5)
NEUTROPHILS NFR BLD AUTO: 72.1 % — SIGNIFICANT CHANGE UP (ref 42.2–75.2)
NRBC # BLD: 0 /100 WBCS — SIGNIFICANT CHANGE UP (ref 0–0)
PHOSPHATE SERPL-MCNC: 3.9 MG/DL — SIGNIFICANT CHANGE UP (ref 2.1–4.9)
PLATELET # BLD AUTO: 218 K/UL — SIGNIFICANT CHANGE UP (ref 130–400)
POTASSIUM SERPL-MCNC: 4.7 MMOL/L — SIGNIFICANT CHANGE UP (ref 3.5–5)
POTASSIUM SERPL-SCNC: 4.7 MMOL/L — SIGNIFICANT CHANGE UP (ref 3.5–5)
RBC # BLD: 5.34 M/UL — SIGNIFICANT CHANGE UP (ref 4.2–5.4)
RBC # FLD: 23.6 % — HIGH (ref 11.5–14.5)
SODIUM SERPL-SCNC: 140 MMOL/L — SIGNIFICANT CHANGE UP (ref 135–146)
WBC # BLD: 9.19 K/UL — SIGNIFICANT CHANGE UP (ref 4.8–10.8)
WBC # FLD AUTO: 9.19 K/UL — SIGNIFICANT CHANGE UP (ref 4.8–10.8)

## 2019-04-08 RX ADMIN — Medication 5 MILLIGRAM(S): at 21:23

## 2019-04-08 RX ADMIN — PANTOPRAZOLE SODIUM 40 MILLIGRAM(S): 20 TABLET, DELAYED RELEASE ORAL at 06:57

## 2019-04-08 RX ADMIN — ATORVASTATIN CALCIUM 80 MILLIGRAM(S): 80 TABLET, FILM COATED ORAL at 21:22

## 2019-04-08 RX ADMIN — RIVAROXABAN 20 MILLIGRAM(S): KIT at 16:46

## 2019-04-08 RX ADMIN — Medication 50 MILLIGRAM(S): at 17:21

## 2019-04-08 RX ADMIN — Medication 325 MILLIGRAM(S): at 11:53

## 2019-04-08 RX ADMIN — Medication 50 MILLIGRAM(S): at 06:57

## 2019-04-09 RX ORDER — BACITRACIN ZINC 500 UNIT/G
1 OINTMENT IN PACKET (EA) TOPICAL
Qty: 0 | Refills: 0 | Status: DISCONTINUED | OUTPATIENT
Start: 2019-04-09 | End: 2019-04-19

## 2019-04-09 RX ADMIN — Medication 1 APPLICATION(S): at 19:47

## 2019-04-09 RX ADMIN — Medication 50 MILLIGRAM(S): at 17:29

## 2019-04-09 RX ADMIN — ATORVASTATIN CALCIUM 80 MILLIGRAM(S): 80 TABLET, FILM COATED ORAL at 22:06

## 2019-04-09 RX ADMIN — Medication 5 MILLIGRAM(S): at 22:08

## 2019-04-09 RX ADMIN — Medication 50 MILLIGRAM(S): at 06:04

## 2019-04-09 RX ADMIN — PANTOPRAZOLE SODIUM 40 MILLIGRAM(S): 20 TABLET, DELAYED RELEASE ORAL at 06:04

## 2019-04-09 RX ADMIN — RIVAROXABAN 20 MILLIGRAM(S): KIT at 17:29

## 2019-04-09 RX ADMIN — Medication 325 MILLIGRAM(S): at 11:58

## 2019-04-10 RX ADMIN — Medication 50 MILLIGRAM(S): at 17:46

## 2019-04-10 RX ADMIN — Medication 1 APPLICATION(S): at 06:40

## 2019-04-10 RX ADMIN — Medication 325 MILLIGRAM(S): at 12:31

## 2019-04-10 RX ADMIN — ATORVASTATIN CALCIUM 80 MILLIGRAM(S): 80 TABLET, FILM COATED ORAL at 21:22

## 2019-04-10 RX ADMIN — Medication 1 APPLICATION(S): at 17:27

## 2019-04-10 RX ADMIN — Medication 50 MILLIGRAM(S): at 06:40

## 2019-04-10 RX ADMIN — RIVAROXABAN 20 MILLIGRAM(S): KIT at 17:27

## 2019-04-10 RX ADMIN — Medication 5 MILLIGRAM(S): at 21:22

## 2019-04-10 RX ADMIN — PANTOPRAZOLE SODIUM 40 MILLIGRAM(S): 20 TABLET, DELAYED RELEASE ORAL at 06:40

## 2019-04-11 RX ADMIN — PANTOPRAZOLE SODIUM 40 MILLIGRAM(S): 20 TABLET, DELAYED RELEASE ORAL at 06:47

## 2019-04-11 RX ADMIN — Medication 1 APPLICATION(S): at 17:57

## 2019-04-11 RX ADMIN — RIVAROXABAN 20 MILLIGRAM(S): KIT at 17:54

## 2019-04-11 RX ADMIN — ATORVASTATIN CALCIUM 80 MILLIGRAM(S): 80 TABLET, FILM COATED ORAL at 22:03

## 2019-04-11 RX ADMIN — Medication 50 MILLIGRAM(S): at 17:52

## 2019-04-11 RX ADMIN — Medication 1 APPLICATION(S): at 06:47

## 2019-04-11 RX ADMIN — Medication 325 MILLIGRAM(S): at 12:09

## 2019-04-11 RX ADMIN — Medication 5 MILLIGRAM(S): at 22:03

## 2019-04-11 RX ADMIN — Medication 50 MILLIGRAM(S): at 06:47

## 2019-04-12 RX ADMIN — ATORVASTATIN CALCIUM 80 MILLIGRAM(S): 80 TABLET, FILM COATED ORAL at 21:17

## 2019-04-12 RX ADMIN — Medication 50 MILLIGRAM(S): at 06:25

## 2019-04-12 RX ADMIN — Medication 1 APPLICATION(S): at 06:24

## 2019-04-12 RX ADMIN — Medication 50 MILLIGRAM(S): at 17:38

## 2019-04-12 RX ADMIN — Medication 325 MILLIGRAM(S): at 11:08

## 2019-04-12 RX ADMIN — Medication 5 MILLIGRAM(S): at 21:21

## 2019-04-12 RX ADMIN — RIVAROXABAN 20 MILLIGRAM(S): KIT at 17:38

## 2019-04-12 RX ADMIN — PANTOPRAZOLE SODIUM 40 MILLIGRAM(S): 20 TABLET, DELAYED RELEASE ORAL at 06:25

## 2019-04-12 RX ADMIN — Medication 1 APPLICATION(S): at 17:38

## 2019-04-13 RX ADMIN — Medication 1 APPLICATION(S): at 17:44

## 2019-04-13 RX ADMIN — Medication 5 MILLIGRAM(S): at 21:39

## 2019-04-13 RX ADMIN — ATORVASTATIN CALCIUM 80 MILLIGRAM(S): 80 TABLET, FILM COATED ORAL at 21:39

## 2019-04-13 RX ADMIN — Medication 650 MILLIGRAM(S): at 17:39

## 2019-04-13 RX ADMIN — Medication 1 APPLICATION(S): at 06:33

## 2019-04-13 RX ADMIN — PANTOPRAZOLE SODIUM 40 MILLIGRAM(S): 20 TABLET, DELAYED RELEASE ORAL at 06:34

## 2019-04-13 RX ADMIN — Medication 50 MILLIGRAM(S): at 17:43

## 2019-04-13 RX ADMIN — RIVAROXABAN 20 MILLIGRAM(S): KIT at 17:43

## 2019-04-13 RX ADMIN — Medication 325 MILLIGRAM(S): at 11:40

## 2019-04-13 RX ADMIN — Medication 650 MILLIGRAM(S): at 17:44

## 2019-04-13 RX ADMIN — Medication 50 MILLIGRAM(S): at 06:34

## 2019-04-14 RX ADMIN — PANTOPRAZOLE SODIUM 40 MILLIGRAM(S): 20 TABLET, DELAYED RELEASE ORAL at 06:43

## 2019-04-14 RX ADMIN — Medication 1 APPLICATION(S): at 18:00

## 2019-04-14 RX ADMIN — Medication 1 APPLICATION(S): at 05:52

## 2019-04-14 RX ADMIN — Medication 50 MILLIGRAM(S): at 05:52

## 2019-04-14 RX ADMIN — Medication 50 MILLIGRAM(S): at 18:00

## 2019-04-14 RX ADMIN — Medication 5 MILLIGRAM(S): at 21:40

## 2019-04-14 RX ADMIN — ATORVASTATIN CALCIUM 80 MILLIGRAM(S): 80 TABLET, FILM COATED ORAL at 21:40

## 2019-04-14 RX ADMIN — Medication 325 MILLIGRAM(S): at 12:53

## 2019-04-14 RX ADMIN — RIVAROXABAN 20 MILLIGRAM(S): KIT at 18:00

## 2019-04-15 LAB
ANION GAP SERPL CALC-SCNC: 11 MMOL/L — SIGNIFICANT CHANGE UP (ref 7–14)
BASOPHILS # BLD AUTO: 0.09 K/UL — SIGNIFICANT CHANGE UP (ref 0–0.2)
BASOPHILS NFR BLD AUTO: 1.1 % — HIGH (ref 0–1)
BUN SERPL-MCNC: 12 MG/DL — SIGNIFICANT CHANGE UP (ref 10–20)
CALCIUM SERPL-MCNC: 8.7 MG/DL — SIGNIFICANT CHANGE UP (ref 8.5–10.1)
CHLORIDE SERPL-SCNC: 105 MMOL/L — SIGNIFICANT CHANGE UP (ref 98–110)
CO2 SERPL-SCNC: 28 MMOL/L — SIGNIFICANT CHANGE UP (ref 17–32)
CREAT SERPL-MCNC: 0.7 MG/DL — SIGNIFICANT CHANGE UP (ref 0.7–1.5)
EOSINOPHIL # BLD AUTO: 0.28 K/UL — SIGNIFICANT CHANGE UP (ref 0–0.7)
EOSINOPHIL NFR BLD AUTO: 3.5 % — SIGNIFICANT CHANGE UP (ref 0–8)
GLUCOSE SERPL-MCNC: 93 MG/DL — SIGNIFICANT CHANGE UP (ref 70–99)
HCT VFR BLD CALC: 39.6 % — SIGNIFICANT CHANGE UP (ref 37–47)
HGB BLD-MCNC: 10.9 G/DL — LOW (ref 12–16)
IMM GRANULOCYTES NFR BLD AUTO: 0.5 % — HIGH (ref 0.1–0.3)
LYMPHOCYTES # BLD AUTO: 1.53 K/UL — SIGNIFICANT CHANGE UP (ref 1.2–3.4)
LYMPHOCYTES # BLD AUTO: 19.1 % — LOW (ref 20.5–51.1)
MAGNESIUM SERPL-MCNC: 1.9 MG/DL — SIGNIFICANT CHANGE UP (ref 1.8–2.4)
MCHC RBC-ENTMCNC: 21.4 PG — LOW (ref 27–31)
MCHC RBC-ENTMCNC: 27.5 G/DL — LOW (ref 32–37)
MCV RBC AUTO: 77.6 FL — LOW (ref 81–99)
MONOCYTES # BLD AUTO: 0.8 K/UL — HIGH (ref 0.1–0.6)
MONOCYTES NFR BLD AUTO: 10 % — HIGH (ref 1.7–9.3)
NEUTROPHILS # BLD AUTO: 5.28 K/UL — SIGNIFICANT CHANGE UP (ref 1.4–6.5)
NEUTROPHILS NFR BLD AUTO: 65.8 % — SIGNIFICANT CHANGE UP (ref 42.2–75.2)
NRBC # BLD: 0 /100 WBCS — SIGNIFICANT CHANGE UP (ref 0–0)
PHOSPHATE SERPL-MCNC: 3.5 MG/DL — SIGNIFICANT CHANGE UP (ref 2.1–4.9)
PLATELET # BLD AUTO: 212 K/UL — SIGNIFICANT CHANGE UP (ref 130–400)
POTASSIUM SERPL-MCNC: 4 MMOL/L — SIGNIFICANT CHANGE UP (ref 3.5–5)
POTASSIUM SERPL-SCNC: 4 MMOL/L — SIGNIFICANT CHANGE UP (ref 3.5–5)
RBC # BLD: 5.1 M/UL — SIGNIFICANT CHANGE UP (ref 4.2–5.4)
RBC # FLD: 25.3 % — HIGH (ref 11.5–14.5)
SODIUM SERPL-SCNC: 144 MMOL/L — SIGNIFICANT CHANGE UP (ref 135–146)
WBC # BLD: 8.02 K/UL — SIGNIFICANT CHANGE UP (ref 4.8–10.8)
WBC # FLD AUTO: 8.02 K/UL — SIGNIFICANT CHANGE UP (ref 4.8–10.8)

## 2019-04-15 RX ADMIN — ATORVASTATIN CALCIUM 80 MILLIGRAM(S): 80 TABLET, FILM COATED ORAL at 21:15

## 2019-04-15 RX ADMIN — Medication 50 MILLIGRAM(S): at 18:22

## 2019-04-15 RX ADMIN — Medication 1 APPLICATION(S): at 18:23

## 2019-04-15 RX ADMIN — RIVAROXABAN 20 MILLIGRAM(S): KIT at 18:22

## 2019-04-15 RX ADMIN — PANTOPRAZOLE SODIUM 40 MILLIGRAM(S): 20 TABLET, DELAYED RELEASE ORAL at 06:02

## 2019-04-15 RX ADMIN — Medication 1 APPLICATION(S): at 05:44

## 2019-04-15 RX ADMIN — Medication 325 MILLIGRAM(S): at 12:52

## 2019-04-15 RX ADMIN — Medication 50 MILLIGRAM(S): at 05:44

## 2019-04-16 RX ADMIN — Medication 50 MILLIGRAM(S): at 06:47

## 2019-04-16 RX ADMIN — PANTOPRAZOLE SODIUM 40 MILLIGRAM(S): 20 TABLET, DELAYED RELEASE ORAL at 06:46

## 2019-04-16 RX ADMIN — Medication 1 APPLICATION(S): at 18:52

## 2019-04-16 RX ADMIN — Medication 5 MILLIGRAM(S): at 06:19

## 2019-04-16 RX ADMIN — Medication 325 MILLIGRAM(S): at 11:40

## 2019-04-16 RX ADMIN — Medication 50 MILLIGRAM(S): at 18:46

## 2019-04-16 RX ADMIN — RIVAROXABAN 20 MILLIGRAM(S): KIT at 18:46

## 2019-04-16 RX ADMIN — ATORVASTATIN CALCIUM 80 MILLIGRAM(S): 80 TABLET, FILM COATED ORAL at 21:52

## 2019-04-16 RX ADMIN — Medication 1 APPLICATION(S): at 06:46

## 2019-04-16 RX ADMIN — Medication 5 MILLIGRAM(S): at 21:53

## 2019-04-17 RX ADMIN — Medication 1 APPLICATION(S): at 18:23

## 2019-04-17 RX ADMIN — Medication 325 MILLIGRAM(S): at 12:23

## 2019-04-17 RX ADMIN — ATORVASTATIN CALCIUM 80 MILLIGRAM(S): 80 TABLET, FILM COATED ORAL at 22:00

## 2019-04-17 RX ADMIN — PANTOPRAZOLE SODIUM 40 MILLIGRAM(S): 20 TABLET, DELAYED RELEASE ORAL at 06:22

## 2019-04-17 RX ADMIN — Medication 5 MILLIGRAM(S): at 22:00

## 2019-04-17 RX ADMIN — RIVAROXABAN 20 MILLIGRAM(S): KIT at 18:24

## 2019-04-17 RX ADMIN — Medication 50 MILLIGRAM(S): at 18:23

## 2019-04-17 RX ADMIN — Medication 50 MILLIGRAM(S): at 06:22

## 2019-04-17 RX ADMIN — Medication 1 APPLICATION(S): at 06:22

## 2019-04-18 RX ORDER — QUETIAPINE FUMARATE 200 MG/1
25 TABLET, FILM COATED ORAL
Qty: 0 | Refills: 0 | Status: DISCONTINUED | OUTPATIENT
Start: 2019-04-18 | End: 2019-04-19

## 2019-04-18 RX ADMIN — Medication 1 APPLICATION(S): at 18:32

## 2019-04-18 RX ADMIN — Medication 1 APPLICATION(S): at 06:50

## 2019-04-18 RX ADMIN — RIVAROXABAN 20 MILLIGRAM(S): KIT at 18:31

## 2019-04-18 RX ADMIN — ATORVASTATIN CALCIUM 80 MILLIGRAM(S): 80 TABLET, FILM COATED ORAL at 21:02

## 2019-04-18 RX ADMIN — Medication 5 MILLIGRAM(S): at 21:02

## 2019-04-18 RX ADMIN — QUETIAPINE FUMARATE 25 MILLIGRAM(S): 200 TABLET, FILM COATED ORAL at 21:02

## 2019-04-18 RX ADMIN — PANTOPRAZOLE SODIUM 40 MILLIGRAM(S): 20 TABLET, DELAYED RELEASE ORAL at 06:50

## 2019-04-18 RX ADMIN — Medication 325 MILLIGRAM(S): at 12:36

## 2019-04-18 RX ADMIN — Medication 50 MILLIGRAM(S): at 18:32

## 2019-04-18 RX ADMIN — Medication 50 MILLIGRAM(S): at 06:50

## 2019-04-19 RX ORDER — ATORVASTATIN CALCIUM 80 MG/1
1 TABLET, FILM COATED ORAL
Qty: 0 | Refills: 0 | COMMUNITY
Start: 2019-04-19

## 2019-04-19 RX ORDER — RIVAROXABAN 15 MG-20MG
1 KIT ORAL
Qty: 30 | Refills: 0
Start: 2019-04-19

## 2019-04-19 RX ORDER — RIVAROXABAN 15 MG-20MG
1 KIT ORAL
Qty: 0 | Refills: 0 | COMMUNITY
Start: 2019-04-19

## 2019-04-19 RX ORDER — FERROUS SULFATE 325(65) MG
1 TABLET ORAL
Qty: 30 | Refills: 0
Start: 2019-04-19 | End: 2019-05-18

## 2019-04-19 RX ORDER — ATORVASTATIN CALCIUM 80 MG/1
1 TABLET, FILM COATED ORAL
Qty: 30 | Refills: 0
Start: 2019-04-19

## 2019-04-19 RX ORDER — METOPROLOL TARTRATE 50 MG
1 TABLET ORAL
Qty: 60 | Refills: 0
Start: 2019-04-19

## 2019-04-19 RX ORDER — QUETIAPINE FUMARATE 200 MG/1
1 TABLET, FILM COATED ORAL
Qty: 0 | Refills: 0 | DISCHARGE
Start: 2019-04-19

## 2019-04-19 RX ORDER — QUETIAPINE FUMARATE 200 MG/1
1 TABLET, FILM COATED ORAL
Qty: 30 | Refills: 0
Start: 2019-04-19

## 2019-04-19 RX ADMIN — Medication 1 APPLICATION(S): at 05:52

## 2019-04-19 RX ADMIN — Medication 50 MILLIGRAM(S): at 05:52

## 2019-04-19 RX ADMIN — PANTOPRAZOLE SODIUM 40 MILLIGRAM(S): 20 TABLET, DELAYED RELEASE ORAL at 06:30

## 2019-04-19 RX ADMIN — Medication 325 MILLIGRAM(S): at 12:43

## 2019-04-19 NOTE — DISCHARGE NOTE PROVIDER - PROVIDER RX CONTACT NUMBER
[phone] Tear of lateral meniscus of right knee, current, unspecified tear type, initial encounter

## 2019-04-19 NOTE — DISCHARGE NOTE NURSING/CASE MANAGEMENT/SOCIAL WORK - NSDCDPATPORTLINK_GEN_ALL_CORE
You can access the [facility], offered by [facility], by registering with the following website: [email or web address]

## 2019-04-19 NOTE — DISCHARGE NOTE NURSING/CASE MANAGEMENT/SOCIAL WORK - NSDCPNDISPN_GEN_ALL_CORE
Side effects of pain management treatment/Activities of daily living, including home environment that might     exacerbate pain or reduce effectiveness of the pain management plan of care as well as strategies to address these issues/Opioids not applicable/not prescribed/Education provided on the pain management plan of care

## 2019-04-19 NOTE — DISCHARGE NOTE PROVIDER - CARE PROVIDER_API CALL
Rei Mittal)  Internal Medicine  [address] [place] [address]  Phone: [phone]  Fax: [phone]  Follow Up Time:     Lionel Enriquez)  EEGEpilepsy; Neurology  [address] [place] [address], [address]  [place] [address]  Phone: [phone]  Fax: [phone]  Follow Up Time:

## 2019-04-19 NOTE — DISCHARGE NOTE PROVIDER - HOSPITAL COURSE
HPI:    89 yo F with PMH significant for Afib non compliant on Xarelto and metoprolol and history of previous CVA with no residual weakness. She was admitted to [facility] on 3/28/19 with new left sided weakness and facial droop. She was found on the floor of the kitchen by family with no obvious trauma c/o of left sided weakness. Initial head CT was negative she was given TPA, MRI revealed small acute infarct in the eight insular cortex frontal lobe and posterior central gyrus, possible micro-hemorrhage  and chronic small vessel ischemia and lacunar infarct. she was restarted on xarelto and metoprolol and she is on Rocephin for urosepsis. she is medically stable and requires min A for transfers and ambulation with RW. She was cleared for puree diet and nectar thickened liquid by speech therapy. She was evaluated by Dr Hernandez and was deemed a good candidate for acute inpatient rehab.        PMH:as above, esophageal stricture, GERD. diverticulosis, HLD, HTN. hip fracture s/p ORIF. s/p cholecystectomy    ALL: NKDA    lives alone, independent PTA        Admission Physical Exam:        Vital signs stable. Afebrile    Gen: alert, NAD    Cardio: RRR    Lungs: clear    Abd: soft, NT    Extremities: without edema. PROM wnl.    Neuro: alert, hard of hearing    oriented to self, follows simple commands    MP: moving extremities equally >5/5, tone WNL                Hospital Course: The patient was admitted to the acute inpatient rehab unit presenting with a decline in functional status. The patient participated in three hours of multidisciplinary therapy 5-6 days per week. The patient was continued on all home medications or equivalent alternatives as deemed appropriate.     During the stay on the inpatient unit, She completed a course of Ceftriaxone for UTI. She received 3 doses of IV Venofer . Patient was started on Seroquel 25 mg as needed for agitation as per neurology    The patient showed as much progress as had been anticipated and was cleared for discharge by a multidisciplinary team.        Discharge functional status: supervision for bed mobility, ambulates 125 feets with RW with supervision        Discharge disposition: home

## 2019-04-19 NOTE — DISCHARGE NOTE NURSING/CASE MANAGEMENT/SOCIAL WORK - NSDCPEPTSTRK_GEN_ALL_CORE
Call 911 for stroke/Need for follow up after discharge/Prescribed medications/Risk factors for stroke/Stroke warning signs and symptoms/Signs and symptoms of stroke/Stroke education booklet/Stroke support groups for patients, families, and friends

## 2019-04-19 NOTE — DISCHARGE NOTE PROVIDER - NSDCCPCAREPLAN_GEN_ALL_CORE_FT
PRINCIPAL DISCHARGE DIAGNOSIS  Diagnosis: Cerebrovascular accident (CVA)  Assessment and Plan of Treatment: Continue aspirin and statin  BLood pressure control  Take seroquel as needed for agitation  Follow up with neurology      SECONDARY DISCHARGE DIAGNOSES  Diagnosis: UTI (urinary tract infection)  Assessment and Plan of Treatment: resolved. S/p IV antibiotics ( ceftriaxone )  Follow up with PMD    Diagnosis: Anemia  Assessment and Plan of Treatment: s/p 3 doses of IV venofer. Continue ferrous sulfate as prescribed  monitor H and H  Follow up with PMD    Diagnosis: Afib  Assessment and Plan of Treatment: Continue Xarelto , metoprolol as prescribed  Follow up with PMD and cardiology

## 2019-04-19 NOTE — DISCHARGE NOTE PROVIDER - CARE PROVIDERS DIRECT ADDRESSES
[email or web address]

## 2019-04-19 NOTE — DISCHARGE NOTE PROVIDER - NSDCHHPTASSISTHOME_GEN_ALL_CORE
You were seen and evaluated in the Emergency Department at [facility] for:     Eye pain    You had the following tests and studies:    Your eye exam showed a small foreign body! We got a part of it out, but a rust ring is still left behind.     You received the following medications:    Erythromycin ointment    You received the following prescriptions:    Erythromycin ointment, use this 4x/day for a week.   ----------------------------    Please make sure to follow up with:    Dr. Lane, Ophthalmology, for a recheck and possible rust ring removal.  The ER for any new or worse pain, fevers, swelling, or any other concerns.  Call our main number to set up a primary care doctor.  Good luck, we hope you get better soon!  ----------------------------    We always encourage patients to return IMMEDIATELY if they have:  Increased or changing pain, passing out, fevers over 100.4 (taken in your mouth or rectally) for more than 2 days, redness or swelling of skin or tissues, feeling like your heart is beating fast, chest pain that is new or worsening, trouble breathing, feeling like your throat is closing up and can not breath, inability to walk, weakness of any part of your body, new dizziness, severe bleeding that won't stop from any part of your body, if you can't eat or drink, or if you have any other concerns.   If you feel worse, please know that you can always return with any questions, concerns, worse symptoms, or you are feeling unsafe. We certainly cannot say for sure that we have ruled out every illness or dangerous disease, but we feel that at this specific time, your exam, tests, and vital signs like heart rate and blood pressure are safe for discharge.       
Patient Needs Assistance to Leave Residence...

## 2019-04-28 ENCOUNTER — EMERGENCY (EMERGENCY)
Facility: HOSPITAL | Age: 84
LOS: 0 days | Discharge: HOME | End: 2019-04-28
Attending: STUDENT IN AN ORGANIZED HEALTH CARE EDUCATION/TRAINING PROGRAM | Admitting: STUDENT IN AN ORGANIZED HEALTH CARE EDUCATION/TRAINING PROGRAM
Payer: COMMERCIAL

## 2019-04-28 VITALS
HEART RATE: 125 BPM | RESPIRATION RATE: 18 BRPM | DIASTOLIC BLOOD PRESSURE: 73 MMHG | TEMPERATURE: 96 F | SYSTOLIC BLOOD PRESSURE: 138 MMHG | OXYGEN SATURATION: 97 %

## 2019-04-28 VITALS
DIASTOLIC BLOOD PRESSURE: 83 MMHG | SYSTOLIC BLOOD PRESSURE: 162 MMHG | HEART RATE: 106 BPM | RESPIRATION RATE: 18 BRPM | TEMPERATURE: 97 F | OXYGEN SATURATION: 98 %

## 2019-04-28 DIAGNOSIS — R41.82 ALTERED MENTAL STATUS, UNSPECIFIED: ICD-10-CM

## 2019-04-28 DIAGNOSIS — I69.392 FACIAL WEAKNESS FOLLOWING CEREBRAL INFARCTION: ICD-10-CM

## 2019-04-28 DIAGNOSIS — K21.9 GASTRO-ESOPHAGEAL REFLUX DISEASE WITHOUT ESOPHAGITIS: ICD-10-CM

## 2019-04-28 DIAGNOSIS — I10 ESSENTIAL (PRIMARY) HYPERTENSION: ICD-10-CM

## 2019-04-28 DIAGNOSIS — R41.0 DISORIENTATION, UNSPECIFIED: ICD-10-CM

## 2019-04-28 DIAGNOSIS — I48.91 UNSPECIFIED ATRIAL FIBRILLATION: ICD-10-CM

## 2019-04-28 DIAGNOSIS — D50.9 IRON DEFICIENCY ANEMIA, UNSPECIFIED: ICD-10-CM

## 2019-04-28 DIAGNOSIS — R13.10 DYSPHAGIA, UNSPECIFIED: ICD-10-CM

## 2019-04-28 DIAGNOSIS — E78.5 HYPERLIPIDEMIA, UNSPECIFIED: ICD-10-CM

## 2019-04-28 DIAGNOSIS — R46.89 OTHER SYMPTOMS AND SIGNS INVOLVING APPEARANCE AND BEHAVIOR: ICD-10-CM

## 2019-04-28 DIAGNOSIS — N39.0 URINARY TRACT INFECTION, SITE NOT SPECIFIED: ICD-10-CM

## 2019-04-28 DIAGNOSIS — Z90.49 ACQUIRED ABSENCE OF OTHER SPECIFIED PARTS OF DIGESTIVE TRACT: Chronic | ICD-10-CM

## 2019-04-28 DIAGNOSIS — Z96.7 PRESENCE OF OTHER BONE AND TENDON IMPLANTS: Chronic | ICD-10-CM

## 2019-04-28 DIAGNOSIS — Z79.899 OTHER LONG TERM (CURRENT) DRUG THERAPY: ICD-10-CM

## 2019-04-28 DIAGNOSIS — F03.90 UNSPECIFIED DEMENTIA WITHOUT BEHAVIORAL DISTURBANCE: ICD-10-CM

## 2019-04-28 DIAGNOSIS — Z91.14 PATIENT'S OTHER NONCOMPLIANCE WITH MEDICATION REGIMEN: ICD-10-CM

## 2019-04-28 DIAGNOSIS — I69.393 ATAXIA FOLLOWING CEREBRAL INFARCTION: ICD-10-CM

## 2019-04-28 DIAGNOSIS — I69.391 DYSPHAGIA FOLLOWING CEREBRAL INFARCTION: ICD-10-CM

## 2019-04-28 DIAGNOSIS — R16.0 HEPATOMEGALY, NOT ELSEWHERE CLASSIFIED: ICD-10-CM

## 2019-04-28 DIAGNOSIS — K22.2 ESOPHAGEAL OBSTRUCTION: ICD-10-CM

## 2019-04-28 DIAGNOSIS — E87.5 HYPERKALEMIA: ICD-10-CM

## 2019-04-28 LAB
ALBUMIN SERPL ELPH-MCNC: 4 G/DL — SIGNIFICANT CHANGE UP (ref 3.5–5.2)
ALP SERPL-CCNC: 120 U/L — HIGH (ref 30–115)
ALT FLD-CCNC: 14 U/L — SIGNIFICANT CHANGE UP (ref 0–41)
ANION GAP SERPL CALC-SCNC: 11 MMOL/L — SIGNIFICANT CHANGE UP (ref 7–14)
APPEARANCE UR: CLEAR — SIGNIFICANT CHANGE UP
APTT BLD: 34.2 SEC — SIGNIFICANT CHANGE UP (ref 27–39.2)
AST SERPL-CCNC: 20 U/L — SIGNIFICANT CHANGE UP (ref 0–41)
BASOPHILS # BLD AUTO: 0.09 K/UL — SIGNIFICANT CHANGE UP (ref 0–0.2)
BASOPHILS NFR BLD AUTO: 1.1 % — HIGH (ref 0–1)
BILIRUB SERPL-MCNC: 0.4 MG/DL — SIGNIFICANT CHANGE UP (ref 0.2–1.2)
BILIRUB UR-MCNC: NEGATIVE — SIGNIFICANT CHANGE UP
BUN SERPL-MCNC: 13 MG/DL — SIGNIFICANT CHANGE UP (ref 10–20)
CALCIUM SERPL-MCNC: 9 MG/DL — SIGNIFICANT CHANGE UP (ref 8.5–10.1)
CHLORIDE SERPL-SCNC: 105 MMOL/L — SIGNIFICANT CHANGE UP (ref 98–110)
CO2 SERPL-SCNC: 27 MMOL/L — SIGNIFICANT CHANGE UP (ref 17–32)
COLOR SPEC: YELLOW — SIGNIFICANT CHANGE UP
COMMENT - URINE: SIGNIFICANT CHANGE UP
CREAT SERPL-MCNC: 0.7 MG/DL — SIGNIFICANT CHANGE UP (ref 0.7–1.5)
DIFF PNL FLD: NEGATIVE — SIGNIFICANT CHANGE UP
EOSINOPHIL # BLD AUTO: 0.16 K/UL — SIGNIFICANT CHANGE UP (ref 0–0.7)
EOSINOPHIL NFR BLD AUTO: 1.9 % — SIGNIFICANT CHANGE UP (ref 0–8)
EPI CELLS # UR: ABNORMAL /HPF
GLUCOSE SERPL-MCNC: 113 MG/DL — HIGH (ref 70–99)
GLUCOSE UR QL: NEGATIVE MG/DL — SIGNIFICANT CHANGE UP
HCT VFR BLD CALC: 41.2 % — SIGNIFICANT CHANGE UP (ref 37–47)
HGB BLD-MCNC: 11.9 G/DL — LOW (ref 12–16)
IMM GRANULOCYTES NFR BLD AUTO: 0.6 % — HIGH (ref 0.1–0.3)
INR BLD: 1.17 RATIO — SIGNIFICANT CHANGE UP (ref 0.65–1.3)
KETONES UR-MCNC: NEGATIVE — SIGNIFICANT CHANGE UP
LEUKOCYTE ESTERASE UR-ACNC: ABNORMAL
LYMPHOCYTES # BLD AUTO: 1.2 K/UL — SIGNIFICANT CHANGE UP (ref 1.2–3.4)
LYMPHOCYTES # BLD AUTO: 14.5 % — LOW (ref 20.5–51.1)
MCHC RBC-ENTMCNC: 22.8 PG — LOW (ref 27–31)
MCHC RBC-ENTMCNC: 28.9 G/DL — LOW (ref 32–37)
MCV RBC AUTO: 79.1 FL — LOW (ref 81–99)
MONOCYTES # BLD AUTO: 0.52 K/UL — SIGNIFICANT CHANGE UP (ref 0.1–0.6)
MONOCYTES NFR BLD AUTO: 6.3 % — SIGNIFICANT CHANGE UP (ref 1.7–9.3)
NEUTROPHILS # BLD AUTO: 6.25 K/UL — SIGNIFICANT CHANGE UP (ref 1.4–6.5)
NEUTROPHILS NFR BLD AUTO: 75.6 % — HIGH (ref 42.2–75.2)
NITRITE UR-MCNC: NEGATIVE — SIGNIFICANT CHANGE UP
NRBC # BLD: 0 /100 WBCS — SIGNIFICANT CHANGE UP (ref 0–0)
PH UR: 7 — SIGNIFICANT CHANGE UP (ref 5–8)
PLATELET # BLD AUTO: 164 K/UL — SIGNIFICANT CHANGE UP (ref 130–400)
POTASSIUM SERPL-MCNC: 4.5 MMOL/L — SIGNIFICANT CHANGE UP (ref 3.5–5)
POTASSIUM SERPL-SCNC: 4.5 MMOL/L — SIGNIFICANT CHANGE UP (ref 3.5–5)
PROT SERPL-MCNC: 7 G/DL — SIGNIFICANT CHANGE UP (ref 6–8)
PROT UR-MCNC: NEGATIVE MG/DL — SIGNIFICANT CHANGE UP
PROTHROM AB SERPL-ACNC: 13.4 SEC — HIGH (ref 9.95–12.87)
RBC # BLD: 5.21 M/UL — SIGNIFICANT CHANGE UP (ref 4.2–5.4)
RBC # FLD: 25.7 % — HIGH (ref 11.5–14.5)
SODIUM SERPL-SCNC: 143 MMOL/L — SIGNIFICANT CHANGE UP (ref 135–146)
SP GR SPEC: 1.01 — SIGNIFICANT CHANGE UP (ref 1.01–1.03)
TROPONIN T SERPL-MCNC: <0.01 NG/ML — SIGNIFICANT CHANGE UP
UROBILINOGEN FLD QL: 0.2 MG/DL — SIGNIFICANT CHANGE UP (ref 0.2–0.2)
WBC # BLD: 8.27 K/UL — SIGNIFICANT CHANGE UP (ref 4.8–10.8)
WBC # FLD AUTO: 8.27 K/UL — SIGNIFICANT CHANGE UP (ref 4.8–10.8)
WBC UR QL: ABNORMAL /HPF

## 2019-04-28 PROCEDURE — 99285 EMERGENCY DEPT VISIT HI MDM: CPT

## 2019-04-28 PROCEDURE — 71045 X-RAY EXAM CHEST 1 VIEW: CPT | Mod: 26

## 2019-04-28 NOTE — ED BEHAVIORAL HEALTH ASSESSMENT NOTE - HPI (INCLUDE ILLNESS QUALITY, SEVERITY, DURATION, TIMING, CONTEXT, MODIFYING FACTORS, ASSOCIATED SIGNS AND SYMPTOMS)
89yo female, domiciled alone until hospital d/c on 4/19, now with 24hr hha, with pmh of uterine prolapse and recent TIA with no pph, IPP admissions or suicide attempts or substance use. Patient biba after daughter became increasingly concerned ongoing agitation since discharge with concern that patient is not allowing hha to stay and may lose her.    On approach patient is calm and engages in interview, although she has difficulty hearing. She is unsure of the reason for her presentation, but reports her daughter is trying to convince her to allow her neighbor to move in. She believes it's because the woman needs a place to stay, although she reports that the woman is smart and helps sometimes. The patient reports she mainly cares for herself and pays all of her bills. 89yo female, domiciled alone until hospital d/c on 4/19, now with 24hr hha, with pmh of uterine prolapse and recent TIA with no pph, IPP admissions or suicide attempts or substance use. Patient biba after daughter became increasingly concerned ongoing agitation since discharge with concern that patient is not allowing hha to stay and may lose her. Patient discharged with Seroquel 25mg prn, taking only at bedtime.    On approach patient is calm and engages in interview, although she has difficulty hearing. She is unsure of the reason for her presentation, but reports her daughter is trying to convince her to allow her neighbor to move in. She believes it's because the woman needs a place to stay, although she reports that the woman is smart and helps sometimes. The patient reports she mainly cares for herself and pays all of her bills. However, her daughter reports that this is not the case and that the patient's son has been managing her finances for months. The patient reports that she has been feeling well and maintains good appetite and sleep regimen; however, her daughter states that she has been increasingly agitated at night despite receiving the Seroquel.    The daughter reports that she would like for the patient to feel calm and continue to reside in her own home and is concerned that the patient's behavior will result in the hha finding another job. She states that no one in the family is available to reside with the patient at present. She reports that since the hha arrived, the patient has been receiving more light during the day. She also reports that the patient spends her day watching tv and that she naps intermittently. She states that the patient has become aggressive towards everyone, threatening to kick the hha out of her home and appearing significantly confused since she was diagnosed with a stroke. Of note, the patient adamantly denies having a stroke saying "I don't feel anything", despite education. 89yo female, domiciled alone until hospital d/c on 4/19, now with 24hr hha, with pmh of uterine prolapse and recent TIA with no pph, IPP admissions or suicide attempts or substance use. Patient biba after daughter became increasingly concerned ongoing agitation since discharge with concern that patient is not allowing hha to stay and may lose her. Patient discharged with Seroquel 25mg prn, taking only at bedtime.    On approach patient is calm and engages in interview, although she has difficulty hearing. She is unsure of the reason for her presentation, but reports her daughter is trying to convince her to allow her neighbor to move in. She believes it's because the woman needs a place to stay, although she reports that the woman is smart and helps sometimes. The patient reports she mainly cares for herself and pays all of her bills. However, her daughter reports that this is not the case and that the patient's son has been managing her finances for months. The patient reports that she has been feeling well and maintains good appetite and sleep regimen; however, her daughter states that she has been increasingly agitated at night despite receiving the Seroquel. The patient denies any thoughts of wanting to die or kill herself and denies ever attempting to harm herself.    The daughter reports that she would like for the patient to feel calm and continue to reside in her own home and is concerned that the patient's behavior will result in the hha finding another job. She states that no one in the family is available to reside with the patient at present. She reports that since the hha arrived, the patient has been receiving more light during the day. She also reports that the patient spends her day watching tv and that she naps intermittently. She states that the patient has become aggressive towards everyone, threatening to kick the hha out of her home and appearing significantly confused since she was diagnosed with a stroke. Of note, the patient adamantly denies having a stroke saying "I don't feel anything", despite education.

## 2019-04-28 NOTE — ED ADULT TRIAGE NOTE - CHIEF COMPLAINT QUOTE
BIBA from home for change in mental status.  As per pt daughter she began to get confused two days ago.  Pt A&OX2 in triage, pt baseline mental status is unclear

## 2019-04-28 NOTE — ED ADULT NURSE NOTE - NSIMPLEMENTINTERV_GEN_ALL_ED
Implemented All Fall with Harm Risk Interventions:  [place] to call system. Call bell, personal items and telephone within reach. Instruct patient to call for assistance. Room bathroom lighting operational. Non-slip footwear when patient is off stretcher. Physically safe environment: no spills, clutter or unnecessary equipment. Stretcher in lowest position, wheels locked, appropriate side rails in place. Provide visual cue, wrist band, yellow gown, etc. Monitor gait and stability. Monitor for mental status changes and reorient to person, place, and time. Review medications for side effects contributing to fall risk. Reinforce activity limits and safety measures with patient and family. Provide visual clues: red socks.

## 2019-04-28 NOTE — ED ADULT NURSE NOTE - OBJECTIVE STATEMENT
Pt BIBA from home; EMS called by daughter for agitation & confusion. Pt was recently d/c from rehab s/p a stroke in October; as per daughter pt became confused after the stroke and was placed on medication but it has not helped her mental state. Pt reports no pain or discomfort at this time; pt is currently A&Ox4, calm & cooperative.

## 2019-04-28 NOTE — ED BEHAVIORAL HEALTH ASSESSMENT NOTE - RISK ASSESSMENT
Patient's suicide risk factors of recent agitation, living along, poor insight and change in treatment is mitigated by social support 24hr hha, access to care, no h/o suicide attempt, no current suicidal ideation.

## 2019-04-28 NOTE — ED PROVIDER NOTE - ATTENDING CONTRIBUTION TO CARE
89 yo f hx afib on xarelto/metoprolol, htn, recent CVA s/p TIA and 89 yo f hx afib on xarelto/metoprolol, htn, recent CVA s/p TIA here for behavior disturbance. per daughter, since the stroke, pt has been more agitated and mildly paranoid. pt went to rehab and recently was d/c home with a HHA. per daughter, pt will accuse her and other family members of plotting against her and will yell at the aid. pt w/o complaints. no acute change. no trauma. no f/c/n/v/cough/dysuria. daughter belives pt needs change in behavioral medications to help her stay calm.  when these behavior changes occurred in hospital, it was believes to be sequelae of cva.    discussed with daughter we will medically clear patient and consult psych. discussed that ED does not rx outpt meds for behavior.    vss  gen- NAD, aaox3  card-rrr  lungs-ctab, no wheezing or rhonchi  abd-sntnd, no guarding or rebound  neuro- full str/sensation, cn ii-xii grossly intact, normal coordination    basic labs, cxr, ua  no fnd suggestive of cva  pt mentation is clear, no evidence of encephalopathy

## 2019-04-28 NOTE — ED BEHAVIORAL HEALTH ASSESSMENT NOTE - DESCRIPTION
Patient seen by ED provider. medical workup ordered, psychiatric consult ordered. Psychiatry clarified consult with Dr. Nuñez then saw patient in private with daughter stepping out of room. Collateral obtained from patient's daughter. tia, prolapsed uterus domiciled alone, raised 6 children, has 18 grandchildren

## 2019-04-28 NOTE — ED BEHAVIORAL HEALTH ASSESSMENT NOTE - SUMMARY
89yo female, domiciled alone until hospital d/c on 4/19, now with 24hr hha, with pmh of uterine prolapse and recent TIA with no pph, IPP admissions or suicide attempts or substance use. Patient olgaa after daughter became increasingly concerned ongoing agitation since discharge with concern that patient is not allowing hha to stay and may lose her. Patient discharged with Seroquel 25mg prn, taking only at bedtime.    On exam, patient is pleasant with no overt signs of depression, buddy, anxiety or psychosis. She exhibits poor insight into her medical condition with evidence suggestion of neurocognitive impairment on exam. Patient's episodes of anxiety appear to be acute, occurring in the setting of recent TIA and subsequent hospitalization. Agitation does not appear to be well controlled with Seroquel at night. Risks associated with antipsychotic use in elderly with dementia was discussed at length with patient's daughter. Antipsychotics have received a black box warning for increased mortality risk for the elderly and they do do not treat dementia, but should only be used for tranquilization when the patient presents to be a risk to her self or others. Environmental modifications/behavioral treatment plans are the first line management.   At this time, patient does not appear to be an imminent risk to herself or others and appears appropriate for continued outpatient management. She may benefit from follow-up in outpatient psychiatric clinic for medication management and ongoing psychotherapy.

## 2019-04-28 NOTE — ED PROVIDER NOTE - CLINICAL SUMMARY MEDICAL DECISION MAKING FREE TEXT BOX
pt here for behavior changes since cva, daughter requesting med adjustment for behavioral meds. medically cleared- normal labs, cxr, ua. no fnd/ams suggestive of utility for cth. psych consulted, no med recommendations acutely, however, pt should f/u [facility].

## 2019-04-28 NOTE — ED BEHAVIORAL HEALTH ASSESSMENT NOTE - SAFETY PLAN DETAILS
Pt/daughter to call 911 or return patient to the nearest Ed if she develops thoughts of wanting to harm herself or anyone else

## 2019-04-28 NOTE — ED PROVIDER NOTE - PHYSICAL EXAMINATION
CONSTITUTIONAL: Well-developed; well-nourished; in no acute distress.   SKIN: warm, dry  HEAD: Normocephalic; atraumatic.  EYES: PERRL, EOMI, normal sclera and conjunctiva   ENT: No nasal discharge; airway clear.  NECK: Supple; non tender.  CARD: S1, S2 normal; no murmurs, gallops, or rubs. Regular rate and rhythm.   RESP: No wheezes, rales or rhonchi.  ABD: soft ntnd  EXT: Normal ROM.  No clubbing, cyanosis or edema.   LYMPH: No acute cervical adenopathy.  NEURO: Alert, oriented, grossly unremarkable  PSYCH: Cooperative, appropriate.

## 2019-04-28 NOTE — ED PROVIDER NOTE - NSFOLLOWUPINSTRUCTIONS_ED_ALL_ED_FT
Follow up with geriatric psychiatry. Return for sudden worsening of mental status, confusion, nausea, vomiting, severe headache, fever, numbness or weakness.

## 2019-04-28 NOTE — ED PROVIDER NOTE - PROVIDER TOKENS
FREE:[LAST:[geriatric],FIRST:[psych],PHONE:[phone]],FAX:[(   )    -],ADDRESS:[[address] [place] [address]]]

## 2019-04-28 NOTE — ED PROVIDER NOTE - OBJECTIVE STATEMENT
pt is a 89 yofw afib on xarelto, HTN, here for ams. pt was last seen a few weeks ago after CVA, was sent to rehab and now presents with joint pain. pt is A+Ox3, denies cp, sob, abdominal pain, fever, dysuria. pt has aide at home to help her with performing ADLs

## 2019-04-29 LAB
CULTURE RESULTS: SIGNIFICANT CHANGE UP
SPECIMEN SOURCE: SIGNIFICANT CHANGE UP

## 2019-07-04 ENCOUNTER — INPATIENT (INPATIENT)
Facility: HOSPITAL | Age: 84
LOS: 0 days | Discharge: LEFT BEFORE DISCHARGE | End: 2019-07-04
Attending: INTERNAL MEDICINE | Admitting: INTERNAL MEDICINE
Payer: MEDICARE

## 2019-07-04 VITALS
HEART RATE: 73 BPM | OXYGEN SATURATION: 96 % | TEMPERATURE: 98 F | RESPIRATION RATE: 16 BRPM | DIASTOLIC BLOOD PRESSURE: 95 MMHG | SYSTOLIC BLOOD PRESSURE: 141 MMHG

## 2019-07-04 VITALS
SYSTOLIC BLOOD PRESSURE: 180 MMHG | RESPIRATION RATE: 18 BRPM | OXYGEN SATURATION: 99 % | HEART RATE: 82 BPM | DIASTOLIC BLOOD PRESSURE: 89 MMHG | TEMPERATURE: 96 F | WEIGHT: 119.93 LBS

## 2019-07-04 DIAGNOSIS — Z90.49 ACQUIRED ABSENCE OF OTHER SPECIFIED PARTS OF DIGESTIVE TRACT: Chronic | ICD-10-CM

## 2019-07-04 DIAGNOSIS — Z96.7 PRESENCE OF OTHER BONE AND TENDON IMPLANTS: Chronic | ICD-10-CM

## 2019-07-04 LAB
ALBUMIN SERPL ELPH-MCNC: 3.2 G/DL — LOW (ref 3.5–5.2)
ALP SERPL-CCNC: 90 U/L — SIGNIFICANT CHANGE UP (ref 30–115)
ALT FLD-CCNC: 8 U/L — SIGNIFICANT CHANGE UP (ref 0–41)
AMMONIA BLD-MCNC: 17 UMOL/L — SIGNIFICANT CHANGE UP (ref 11–55)
ANION GAP SERPL CALC-SCNC: 11 MMOL/L — SIGNIFICANT CHANGE UP (ref 7–14)
APPEARANCE UR: CLEAR — SIGNIFICANT CHANGE UP
AST SERPL-CCNC: 18 U/L — SIGNIFICANT CHANGE UP (ref 0–41)
BASOPHILS # BLD AUTO: 0.1 K/UL — SIGNIFICANT CHANGE UP (ref 0–0.2)
BASOPHILS NFR BLD AUTO: 1 % — SIGNIFICANT CHANGE UP (ref 0–1)
BILIRUB SERPL-MCNC: 0.2 MG/DL — SIGNIFICANT CHANGE UP (ref 0.2–1.2)
BILIRUB UR-MCNC: NEGATIVE — SIGNIFICANT CHANGE UP
BUN SERPL-MCNC: 15 MG/DL — SIGNIFICANT CHANGE UP (ref 10–20)
CALCIUM SERPL-MCNC: 8.6 MG/DL — SIGNIFICANT CHANGE UP (ref 8.5–10.1)
CHLORIDE SERPL-SCNC: 106 MMOL/L — SIGNIFICANT CHANGE UP (ref 98–110)
CO2 SERPL-SCNC: 25 MMOL/L — SIGNIFICANT CHANGE UP (ref 17–32)
COLOR SPEC: YELLOW — SIGNIFICANT CHANGE UP
CREAT SERPL-MCNC: 0.7 MG/DL — SIGNIFICANT CHANGE UP (ref 0.7–1.5)
DIFF PNL FLD: NEGATIVE — SIGNIFICANT CHANGE UP
EOSINOPHIL # BLD AUTO: 1.15 K/UL — HIGH (ref 0–0.7)
EOSINOPHIL NFR BLD AUTO: 11.4 % — HIGH (ref 0–8)
GLUCOSE SERPL-MCNC: 130 MG/DL — HIGH (ref 70–99)
GLUCOSE UR QL: NEGATIVE MG/DL — SIGNIFICANT CHANGE UP
HCT VFR BLD CALC: 37.8 % — SIGNIFICANT CHANGE UP (ref 37–47)
HGB BLD-MCNC: 11.5 G/DL — LOW (ref 12–16)
IMM GRANULOCYTES NFR BLD AUTO: 0.4 % — HIGH (ref 0.1–0.3)
KETONES UR-MCNC: NEGATIVE — SIGNIFICANT CHANGE UP
LACTATE SERPL-SCNC: 1 MMOL/L — SIGNIFICANT CHANGE UP (ref 0.5–2.2)
LEUKOCYTE ESTERASE UR-ACNC: NEGATIVE — SIGNIFICANT CHANGE UP
LYMPHOCYTES # BLD AUTO: 1.65 K/UL — SIGNIFICANT CHANGE UP (ref 1.2–3.4)
LYMPHOCYTES # BLD AUTO: 16.3 % — LOW (ref 20.5–51.1)
MCHC RBC-ENTMCNC: 25.3 PG — LOW (ref 27–31)
MCHC RBC-ENTMCNC: 30.4 G/DL — LOW (ref 32–37)
MCV RBC AUTO: 83.1 FL — SIGNIFICANT CHANGE UP (ref 81–99)
MONOCYTES # BLD AUTO: 0.9 K/UL — HIGH (ref 0.1–0.6)
MONOCYTES NFR BLD AUTO: 8.9 % — SIGNIFICANT CHANGE UP (ref 1.7–9.3)
NEUTROPHILS # BLD AUTO: 6.28 K/UL — SIGNIFICANT CHANGE UP (ref 1.4–6.5)
NEUTROPHILS NFR BLD AUTO: 62 % — SIGNIFICANT CHANGE UP (ref 42.2–75.2)
NITRITE UR-MCNC: NEGATIVE — SIGNIFICANT CHANGE UP
NRBC # BLD: 0 /100 WBCS — SIGNIFICANT CHANGE UP (ref 0–0)
PH UR: 6 — SIGNIFICANT CHANGE UP (ref 5–8)
PLATELET # BLD AUTO: 180 K/UL — SIGNIFICANT CHANGE UP (ref 130–400)
POTASSIUM SERPL-MCNC: 3.9 MMOL/L — SIGNIFICANT CHANGE UP (ref 3.5–5)
POTASSIUM SERPL-SCNC: 3.9 MMOL/L — SIGNIFICANT CHANGE UP (ref 3.5–5)
PROT SERPL-MCNC: 6 G/DL — SIGNIFICANT CHANGE UP (ref 6–8)
PROT UR-MCNC: NEGATIVE MG/DL — SIGNIFICANT CHANGE UP
RBC # BLD: 4.55 M/UL — SIGNIFICANT CHANGE UP (ref 4.2–5.4)
RBC # FLD: 16.5 % — HIGH (ref 11.5–14.5)
SODIUM SERPL-SCNC: 142 MMOL/L — SIGNIFICANT CHANGE UP (ref 135–146)
SP GR SPEC: 1.01 — SIGNIFICANT CHANGE UP (ref 1.01–1.03)
UROBILINOGEN FLD QL: 0.2 MG/DL — SIGNIFICANT CHANGE UP (ref 0.2–0.2)
WBC # BLD: 10.12 K/UL — SIGNIFICANT CHANGE UP (ref 4.8–10.8)
WBC # FLD AUTO: 10.12 K/UL — SIGNIFICANT CHANGE UP (ref 4.8–10.8)

## 2019-07-04 PROCEDURE — 99285 EMERGENCY DEPT VISIT HI MDM: CPT

## 2019-07-04 PROCEDURE — 70450 CT HEAD/BRAIN W/O DYE: CPT | Mod: 26

## 2019-07-04 RX ORDER — SODIUM CHLORIDE 9 MG/ML
1000 INJECTION, SOLUTION INTRAVENOUS ONCE
Refills: 0 | Status: COMPLETED | OUTPATIENT
Start: 2019-07-04 | End: 2019-07-04

## 2019-07-04 RX ORDER — DIPHENHYDRAMINE HCL 50 MG
50 CAPSULE ORAL ONCE
Refills: 0 | Status: COMPLETED | OUTPATIENT
Start: 2019-07-04 | End: 2019-07-04

## 2019-07-04 RX ADMIN — Medication 50 MILLIGRAM(S): at 04:16

## 2019-07-04 RX ADMIN — SODIUM CHLORIDE 1000 MILLILITER(S): 9 INJECTION, SOLUTION INTRAVENOUS at 04:16

## 2019-07-04 NOTE — ED PROVIDER NOTE - CLINICAL SUMMARY MEDICAL DECISION MAKING FREE TEXT BOX
geriatrtic pt with worsening agitation over the last 2 months, more so the last few days.  on cipro for reported uti. CT brain, labs done, daughter felt pt is unsafe at home, admitted for agitation and social eval.

## 2019-07-04 NOTE — PROGRESS NOTE ADULT - SUBJECTIVE AND OBJECTIVE BOX
Medicine Admit Note    Patient was seen and examined in ED 15a. Spoke with RN and daughter. Chart reviewed.  No new events overnight.  Vital Signs Last 24 Hrs  T(F): 97.8 (2019 04:15), Max: 97.8 (2019 04:15)  HR: 73 (2019 04:15) (73 - 82)  BP: 141/95 (2019 04:15) (141/95 - 180/89)  SpO2: 96% (2019 04:15) (96% - 99%)  MEDICATIONS  (STANDING):    MEDICATIONS  (PRN):    Labs:                        11.5   10.12 )-----------( 180      ( 2019 02:18 )             37.8     2019 02:18    142    |  106    |  15     ----------------------------<  130    3.9     |  25     |  0.7      Ca    8.6        2019 02:18    TPro  6.0    /  Alb  3.2    /  TBili  0.2    /  DBili  x      /  AST  18     /  ALT  8      /  AlkPhos  90     2019 02:18      Urinalysis Basic - ( 2019 04:00 )    Color: Yellow / Appearance: Clear / S.015 / pH: x  Gluc: x / Ketone: Negative  / Bili: Negative / Urobili: 0.2 mg/dL   Blood: x / Protein: Negative mg/dL / Nitrite: Negative   Leuk Esterase: Negative / RBC: x / WBC x   Sq Epi: x / Non Sq Epi: x / Bacteria: x        General: comfortable, NAD  Neurology: nonfocal, confused  Head:  Normocephalic, atraumatic  ENT:  Mucosa moist, no ulcerations  Neck:  Supple, no JVD,   Resp: Coarse B/L  CV: RRR, S1S2,   GI: Soft, NT, bowel sounds  MS: No edema, + peripheral pulses,    CT head noted      A/P:    89 y/o female with pmhx of afib on xarelto, GERD, DLD, HTN, stroke brought in by daughter for evaluation. Patient's daughter states she has been acting more confused and more agitated for the past 5 days; admits to patient currently undergoing treatment for UTI, on day 6 of cipro. Daughter also admits to patient scratching herself a lot over abdomen and groin area.     Dc cipro    check urine culture     neurology eval     topical treatment for dermatitis    elemite treatment empirically    PT/rehab eval    social service/case managment  DVT prophylaxis  Decubitus prevention- all measures as per RN protocol  Please call or text me with any questions or updates

## 2019-07-04 NOTE — ED ADULT NURSE NOTE - OBJECTIVE STATEMENT
As per daughter pt with "sever confusion" agitated, aggressive. States pt has a UTI and prolapsed uterus and has been scratching vaginal area. States pt is on Xarelto and had been bleeding "down below" As per daughter pt with "severe confusion" agitated, aggressive. States pt has a UTI and prolapsed uterus and has been scratching vaginal area. States pt is on Xarelto and had been bleeding "down below".     Pt noted with scratching back. Rash to back and chest noted. Bruising noted to right forearm

## 2019-07-04 NOTE — ED ADULT TRIAGE NOTE - CHIEF COMPLAINT QUOTE
as per daughter the patient has a history of confusion but for the past 5 days she has been more confused that normal. also the patient is on xeralto and as per daughter she has vaginal bleeding also

## 2019-07-04 NOTE — ED PROVIDER NOTE - OBJECTIVE STATEMENT
89 y/o female with pmhx of afib on xarelto, GERD, DLD, HTN, stroke brought in by daughter for evaluation. Patient's daughter states she has been acting more confused and more agitated for the past 5 days; admits to patient currently undergoing treatment for UTI, on day 6 of cipro. Daughter also admits to patient scratching herself a lot over abdomen and groin area. Daughter denies fevers/chills, respiratory distress, v/d, trauma/falls.

## 2019-07-04 NOTE — ED PROVIDER NOTE - ATTENDING CONTRIBUTION TO CARE
elderly fm here for worsening agitation, currently on abx for uti. no fever or reported injury. this hads been going on for 1-2 months, much worse the last few days.   pt is calm, laying comfortable, Ox1, following commands. ncat, perrl, eomi, neck sup, s1s2, cta, ab soft, nt. no gross focal def. will check labs, ua, ct head.

## 2019-07-04 NOTE — ED PROVIDER NOTE - PROGRESS NOTE DETAILS
pt is calm, she is confused. this is probably a worsening of dementia rather than a delirium. [facility]. daughter sts pt lives with her but she is not around all the time, her confusion and agitation are getting worse. she would prefer not to take her home until pt is more stable in this regard. offered her a [facility] admission but they declined.

## 2019-07-04 NOTE — ED ADULT NURSE NOTE - NSIMPLEMENTINTERV_GEN_ALL_ED
Implemented All Universal Safety Interventions:  [place] to call system. Call bell, personal items and telephone within reach. Instruct patient to call for assistance. Room bathroom lighting operational. Non-slip footwear when patient is off stretcher. Physically safe environment: no spills, clutter or unnecessary equipment. Stretcher in lowest position, wheels locked, appropriate side rails in place.

## 2019-07-04 NOTE — CHART NOTE - NSCHARTNOTEFT_GEN_A_CORE
Patient's daughter requests the patient be discharged. The patient presented for confusion however per the daughter she has dementia and she has bouts of confusion and this is usual for her. Since she had the stroke there was a more rapid decline. Workup in the ED was negative Patient's daughter requests the patient be discharged. The patient presented for confusion however per the daughter she has dementia and she has bouts of confusion and this is usual for her. Since she had the stroke there was a more rapid decline. Workup in the ED was negative: no abnormalities in the CT head, no metabolic abnormalities and UA was negative   I explained that technically the patient was meant to be admitted and that a full workup up and evaluation was not done. The daughter stated that she was admitted several times with similar symptoms and workup was negative and she attributes that to the dementia   The patient was also scratching her abdomen and arms. Follow up with PCP for emollients vs fungal creams was recommended. And I urged the daughter to bring the patient back if any worsening in the status.   The patient was otherwise in good spirits, walking with minimal assistance, and AAOx3 at that time   She will be discharged AMA with risks explained with teachback to the daughter   No discharge was done as the patient's H&P was not done

## 2019-07-04 NOTE — ED PROVIDER NOTE - PHYSICAL EXAMINATION
CONSTITUTIONAL: Well-developed; well-nourished; in no acute distress. Awake and alert   SKIN: warm, dry; + excoriations noted over abdomen, labia, + dried blood noted over excoriated labia  HEAD: Normocephalic; atraumatic.  EYES: no conj injection  CARD: S1, S2 normal; no murmurs, gallops, or rubs. Regular rate and rhythm.   RESP: No wheezes, rales or rhonchi.  ABD: soft ntnd  : externally visualized genitalia, no blood noted in opening of vaginal vault/internal labia; excoriations noted over external labia  EXT: No clubbing, cyanosis or edema.   NEURO: Alert, oriented, grossly unremarkable  PSYCH: Cooperative, appropriate.

## 2019-07-04 NOTE — ED PROVIDER NOTE - CARE PLAN
Principal Discharge DX:	Social problem Principal Discharge DX:	Agitation  Secondary Diagnosis:	Dermatitis

## 2019-07-05 LAB
CULTURE RESULTS: NO GROWTH — SIGNIFICANT CHANGE UP
SPECIMEN SOURCE: SIGNIFICANT CHANGE UP

## 2019-07-10 DIAGNOSIS — Z86.73 PERSONAL HISTORY OF TRANSIENT ISCHEMIC ATTACK (TIA), AND CEREBRAL INFARCTION WITHOUT RESIDUAL DEFICITS: ICD-10-CM

## 2019-07-10 DIAGNOSIS — N39.0 URINARY TRACT INFECTION, SITE NOT SPECIFIED: ICD-10-CM

## 2019-07-10 DIAGNOSIS — R41.0 DISORIENTATION, UNSPECIFIED: ICD-10-CM

## 2019-07-10 DIAGNOSIS — I48.91 UNSPECIFIED ATRIAL FIBRILLATION: ICD-10-CM

## 2019-07-10 DIAGNOSIS — F03.90 UNSPECIFIED DEMENTIA WITHOUT BEHAVIORAL DISTURBANCE: ICD-10-CM

## 2019-07-10 DIAGNOSIS — Z79.01 LONG TERM (CURRENT) USE OF ANTICOAGULANTS: ICD-10-CM

## 2019-07-10 DIAGNOSIS — E78.5 HYPERLIPIDEMIA, UNSPECIFIED: ICD-10-CM

## 2019-07-10 DIAGNOSIS — L30.9 DERMATITIS, UNSPECIFIED: ICD-10-CM

## 2019-07-10 DIAGNOSIS — K21.9 GASTRO-ESOPHAGEAL REFLUX DISEASE WITHOUT ESOPHAGITIS: ICD-10-CM

## 2019-07-10 DIAGNOSIS — I10 ESSENTIAL (PRIMARY) HYPERTENSION: ICD-10-CM

## 2019-11-15 NOTE — ED ADULT NURSE NOTE - PAIN RATING/NUMBER SCALE (0-10): REST
Patient attended the colon class at [place]. Patient scheduled with Dr Oakley at the [facility], 11/27/19 @ 9am. Prep instructions, location instructions and referral will be mailed out to the patient's address on file. Referral and Rx given to MA on duty. GHANSHYAM Gonzalez  
0

## 2022-02-24 NOTE — ED ADULT TRIAGE NOTE - TEMPERATURE IN CELSIUS (DEGREES C)
Abdomen , soft, nontender, nondistended , no guarding or rigidity , no masses palpable , normal bowel sounds , Liver and Spleen , no hepatomegaly present , no hepatosplenomegaly , liver nontender , spleen not palpable 35.8

## 2023-07-24 NOTE — PATIENT PROFILE ADULT - TYPE OF EQUIPMENT CURRENTLY USED AT HOME
PROCEDURE NOTE - FLEXIBLE LARYNGOSCOPY    Patient Name Brenda Frazier   Sex female   Age 61 year old   Procedure Date Outpatient Encounter Date: 07/24/23   Requesting Physician/Service Raul Vick MD   Surgery Attending Raul Vick MD   Others Present Kiera Hoenig, RN   Procedure FLEXIBLE LARYNGOSCOPY   Additional Procedure Information: NONE       ATTESTATION STATEMENT  As the attending physician, I was present for the entire procedure.    INDICATION(S) FOR PROCEDURE  Brenda Frazier is a 61 year old female with Graves disease s/p right thyroid lobectomy desiring completion thyroidectomy.  Flexible laryngoscopy is performed to better examine and assess vocal cord function in the patient today.    DESCRIPTION OF PROCEDURE  After the risks, benefits, indications, potential complications, and alternatives were explained to the patient and informed consent was obtained, the patient was prepared for the procedure.    After anesthetizing the nasal cavity with topical oxymetazoline and lidocaine, a flexible fiberoptic pediatric endoscope was passed through the right nares. In passing through the nasal cavity, septum appeared normal. The nasopharynx showed no abnormalities.  After advancing the endoscope through the nasopharyx, the hypopharynx and larynx came into view.     The following attributes were observed:        Epiglottis Appearance normal   False Vocal Cords Appearance normal   Right True Vocal Cords Appearance normal mucosa   Left True Vocal Cords Appearance normal mucosa   Right True Vocal Cord Function moved well    Left True Vocal Cord Function moved well              The endoscope was then removed. The patient tolerated the procedure well.       walker, standard

## 2023-12-04 NOTE — H&P ADULT - HISTORY OF PRESENT ILLNESS
Patient Dropped off paperwork that needs to be filled out and faxed by Thursday 12/7/2023. She has surgery this Friday 12/08/2023 and would like this paper to be faxed over prior to her surgery.     Brought paperwork back to Womens department.     [phone]  [place] LARKIN    89 years old female pt with past medical hx 89 years old female pt with past medical hx of Afib was on xarelto and metoprolol but non compliant, DLD, old CVA with no residual weakness, GERD, diverticulosis brought to ER because of left sided weakness and facial droop.  As per daughters, they were watching her in camera, last movement they saw was 11: 34, then they didnt see any movement, they found her on the floor in the kitchen, no head trauma, ws complaining of left sided weakness and facial droop. she denies any chest pain, palpitation, no visual symptoms, no dizziness.  she denies any fever, no GI or urinary complaints ( no change in frequency).  she lives alone, is very active, does everything for her daily activities.  while in ER, her NIHSS was 12, ct scan head was normal, she was within the window period was given TPA. NIHSS improved from 12--4, neurology on board.  she was admitted in october 2018 because of UTI.

## 2024-11-08 NOTE — DISCHARGE NOTE PROVIDER - NSDCQMSTATINA_GEN_A_CORE
LOV 9/4/24   RTO 12/13/24  LRF 10/4/24          Controlled Substance Monitoring:    Acute and Chronic Pain Monitoring:   RX Monitoring Periodic Controlled Substance Monitoring   10/4/2024   5:53 PM No signs of potential drug abuse or diversion identified.            
Yes